# Patient Record
Sex: FEMALE | Race: WHITE | NOT HISPANIC OR LATINO | URBAN - METROPOLITAN AREA
[De-identification: names, ages, dates, MRNs, and addresses within clinical notes are randomized per-mention and may not be internally consistent; named-entity substitution may affect disease eponyms.]

---

## 2017-02-20 ENCOUNTER — OUTPATIENT (OUTPATIENT)
Dept: OUTPATIENT SERVICES | Facility: HOSPITAL | Age: 77
LOS: 1 days | Discharge: HOME | End: 2017-02-20

## 2017-06-27 DIAGNOSIS — Z12.31 ENCOUNTER FOR SCREENING MAMMOGRAM FOR MALIGNANT NEOPLASM OF BREAST: ICD-10-CM

## 2017-06-27 PROBLEM — Z00.00 ENCOUNTER FOR PREVENTIVE HEALTH EXAMINATION: Status: ACTIVE | Noted: 2017-06-27

## 2018-03-07 ENCOUNTER — OUTPATIENT (OUTPATIENT)
Dept: OUTPATIENT SERVICES | Facility: HOSPITAL | Age: 78
LOS: 1 days | Discharge: HOME | End: 2018-03-07

## 2018-03-07 DIAGNOSIS — R92.2 INCONCLUSIVE MAMMOGRAM: ICD-10-CM

## 2018-03-07 DIAGNOSIS — Z12.31 ENCOUNTER FOR SCREENING MAMMOGRAM FOR MALIGNANT NEOPLASM OF BREAST: ICD-10-CM

## 2018-06-11 ENCOUNTER — OUTPATIENT (OUTPATIENT)
Dept: OUTPATIENT SERVICES | Facility: HOSPITAL | Age: 78
LOS: 1 days | Discharge: HOME | End: 2018-06-11

## 2018-06-11 DIAGNOSIS — M17.0 BILATERAL PRIMARY OSTEOARTHRITIS OF KNEE: ICD-10-CM

## 2018-11-14 ENCOUNTER — EMERGENCY (EMERGENCY)
Facility: HOSPITAL | Age: 78
LOS: 0 days | Discharge: HOME | End: 2018-11-14
Attending: EMERGENCY MEDICINE | Admitting: EMERGENCY MEDICINE

## 2018-11-14 VITALS
SYSTOLIC BLOOD PRESSURE: 186 MMHG | OXYGEN SATURATION: 95 % | HEART RATE: 69 BPM | TEMPERATURE: 97 F | DIASTOLIC BLOOD PRESSURE: 91 MMHG | HEIGHT: 60 IN | RESPIRATION RATE: 20 BRPM | WEIGHT: 164.91 LBS

## 2018-11-14 VITALS
RESPIRATION RATE: 18 BRPM | HEART RATE: 70 BPM | OXYGEN SATURATION: 99 % | DIASTOLIC BLOOD PRESSURE: 80 MMHG | SYSTOLIC BLOOD PRESSURE: 170 MMHG

## 2018-11-14 DIAGNOSIS — Z23 ENCOUNTER FOR IMMUNIZATION: ICD-10-CM

## 2018-11-14 DIAGNOSIS — I10 ESSENTIAL (PRIMARY) HYPERTENSION: ICD-10-CM

## 2018-11-14 DIAGNOSIS — Z88.0 ALLERGY STATUS TO PENICILLIN: ICD-10-CM

## 2018-11-14 DIAGNOSIS — Z98.890 OTHER SPECIFIED POSTPROCEDURAL STATES: Chronic | ICD-10-CM
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
## 2018-11-14 DIAGNOSIS — S00.31XA ABRASION OF NOSE, INITIAL ENCOUNTER: ICD-10-CM

## 2018-11-14 DIAGNOSIS — F32.9 MAJOR DEPRESSIVE DISORDER, SINGLE EPISODE, UNSPECIFIED: ICD-10-CM
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
## 2018-11-14 DIAGNOSIS — E78.5 HYPERLIPIDEMIA, UNSPECIFIED: ICD-10-CM

## 2018-11-14 DIAGNOSIS — Z79.82 LONG TERM (CURRENT) USE OF ASPIRIN: ICD-10-CM
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
## 2018-11-14 DIAGNOSIS — S80.212A ABRASION, LEFT KNEE, INITIAL ENCOUNTER: ICD-10-CM

## 2018-11-14 DIAGNOSIS — M25.562 PAIN IN LEFT KNEE: ICD-10-CM

## 2018-11-14 RX ORDER — TETANUS TOXOID, REDUCED DIPHTHERIA TOXOID AND ACELLULAR PERTUSSIS VACCINE, ADSORBED 5; 2.5; 8; 8; 2.5 [IU]/.5ML; [IU]/.5ML; UG/.5ML; UG/.5ML; UG/.5ML
0.5 SUSPENSION INTRAMUSCULAR ONCE
Qty: 0 | Refills: 0 | Status: COMPLETED | OUTPATIENT
Start: 2018-11-14 | End: 2018-11-14

## 2018-11-14 RX ADMIN — TETANUS TOXOID, REDUCED DIPHTHERIA TOXOID AND ACELLULAR PERTUSSIS VACCINE, ADSORBED 0.5 MILLILITER(S): 5; 2.5; 8; 8; 2.5 SUSPENSION INTRAMUSCULAR at 15:07

## 2018-11-14 NOTE — ED PROVIDER NOTE - NS ED ROS FT
Review of Systems    Constitutional: (-) fever  Eyes/ENT: (-) blurry vision, (-) epistaxis, (+) injury to nose  Cardiovascular: (-) chest pain, (-) syncope  Respiratory: (-) cough, (-) shortness of breath  Gastrointestinal: (-) vomiting, (-) diarrhea  Musculoskeletal: (-) neck pain, (-) back pain, (+) left knee pain  Integumentary: (+) abrasions, (-) rash, (-) edema  Neurological: (-) headache, (-) altered mental status

## 2018-11-14 NOTE — ED PROVIDER NOTE - CARE PROVIDERS DIRECT ADDRESSES
,DirectAddress_Unknown,[email or web address]

## 2018-11-14 NOTE — ED PROVIDER NOTE - MEDICAL DECISION MAKING DETAILS
I personally evaluated the patient. I reviewed the Resident’s or Physician Assistant’s note (as assigned above), and agree with the findings and plan except as documented in my note. 77yo F comes in s/p trip and fall landing on her knees and falling forward hitting her face. No LOC, no vomiting, no neuro issues, pt is able to ambulate well. PE: Left knee with superficial abrasion, FROM of b/l knees, Nose non displaced, no septal hematoma. Plan CT, XR and re-eval

## 2018-11-14 NOTE — ED PROVIDER NOTE - PHYSICAL EXAMINATION
Gen: Alert, NAD, well appearing  Head: NC, AT, PERRL, EOMI, normal lids/conjunctiva  ENT: normal hearing, patent oropharynx. +swelling to nose, abrasion to nose. No septal hematoma  Neck: +supple, no tenderness/meningismus,  Pulm: Bilateral BS, normal resp effort, no wheeze/stridor/retractions  CV: RRR, no murmer  Abd: soft, NT/ND  Mskel: left knee: +tender, swelling, abrasion. No ecchymosis. ROM intact. Normal gait in ED. FROM to hands. NT to hands. Non tender to back on palpation. no edema/erythema/cyanosis.   Skin: no rash, warm/dry. +abrasion to nose, knees  Neuro: AAOx3, no sensory/motor deficits

## 2018-11-14 NOTE — ED ADULT TRIAGE NOTE - CHIEF COMPLAINT QUOTE
"I fell." Pt complains of pain to bilateral knees of which she has abrasion to left knee. Pt also complains of pain to the nose. Pt takes Aspirin 81 mg daily.

## 2018-11-14 NOTE — ED PROVIDER NOTE - ATTENDING CONTRIBUTION TO CARE
I personally evaluated the patient. I reviewed the Resident’s or Physician Assistant’s note (as assigned above), and agree with the findings and plan except as documented in my note. 77yo F comes in s/p trip and fall landing on her knees and falling forward hitting her face. No LOC, no vomiting, no neuro issues, pt is able to ambulate well. PE: Left knee with superficial abrasion, FROM

## 2018-11-14 NOTE — ED PROVIDER NOTE - CARE PROVIDER_API CALL
your pMD,   Phone: (   )    -  Fax: (   )    -    Arben Shepherd), Otolaryngology  [address] [place] [address]  Phone: [phone]  Fax: [phone]

## 2018-11-14 NOTE — ED PROVIDER NOTE - OBJECTIVE STATEMENT
79 yo F hx of HTN, high cholesterol here s/p fall. Patient tripped and fell at [employer] and landed on her hands, knees and nose. c/o pain to left knee and nose. No HA, LOC, n/v or change in mental status. No pain to neck or back. Patient ambulatory since fall. +abrasions

## 2018-11-14 NOTE — ED ADULT NURSE NOTE - PMH
Depressive disorder  Depression  Essential hypertension  Hypertension  Hyperlipidemia  Hyperlipidemia  Seizure

## 2019-05-31 PROBLEM — R56.9 UNSPECIFIED CONVULSIONS: Chronic | Status: ACTIVE | Noted: 2018-11-14

## 2019-06-12 ENCOUNTER — OUTPATIENT (OUTPATIENT)
Dept: OUTPATIENT SERVICES | Facility: HOSPITAL | Age: 79
LOS: 1 days | Discharge: HOME | End: 2019-06-12
Payer: MEDICARE

## 2019-06-12 DIAGNOSIS — Z80.3 FAMILY HISTORY OF MALIGNANT NEOPLASM OF BREAST: ICD-10-CM

## 2019-06-12 DIAGNOSIS — R92.2 INCONCLUSIVE MAMMOGRAM: ICD-10-CM

## 2019-06-12 DIAGNOSIS — Z12.31 ENCOUNTER FOR SCREENING MAMMOGRAM FOR MALIGNANT NEOPLASM OF BREAST: ICD-10-CM

## 2019-06-12 DIAGNOSIS — Z98.890 OTHER SPECIFIED POSTPROCEDURAL STATES: Chronic | ICD-10-CM

## 2019-06-12 PROCEDURE — 77063 BREAST TOMOSYNTHESIS BI: CPT | Mod: 26

## 2019-06-12 PROCEDURE — 76641 ULTRASOUND BREAST COMPLETE: CPT | Mod: 26,50

## 2019-06-12 PROCEDURE — 77067 SCR MAMMO BI INCL CAD: CPT | Mod: 26

## 2019-06-23 ENCOUNTER — EMERGENCY (EMERGENCY)
Facility: HOSPITAL | Age: 79
LOS: 0 days | Discharge: HOME | End: 2019-06-23
Attending: EMERGENCY MEDICINE | Admitting: EMERGENCY MEDICINE
Payer: MEDICARE

## 2019-06-23 VITALS
SYSTOLIC BLOOD PRESSURE: 159 MMHG | OXYGEN SATURATION: 99 % | RESPIRATION RATE: 18 BRPM | TEMPERATURE: 98 F | HEIGHT: 60 IN | WEIGHT: 164.02 LBS | DIASTOLIC BLOOD PRESSURE: 75 MMHG | HEART RATE: 66 BPM

## 2019-06-23 VITALS — SYSTOLIC BLOOD PRESSURE: 164 MMHG | HEART RATE: 59 BPM | DIASTOLIC BLOOD PRESSURE: 70 MMHG

## 2019-06-23 DIAGNOSIS — E89.0 POSTPROCEDURAL HYPOTHYROIDISM: ICD-10-CM

## 2019-06-23 DIAGNOSIS — Z91.041 RADIOGRAPHIC DYE ALLERGY STATUS: ICD-10-CM

## 2019-06-23 DIAGNOSIS — Z98.890 OTHER SPECIFIED POSTPROCEDURAL STATES: Chronic | ICD-10-CM

## 2019-06-23 DIAGNOSIS — R07.9 CHEST PAIN, UNSPECIFIED: ICD-10-CM

## 2019-06-23 DIAGNOSIS — Z79.899 OTHER LONG TERM (CURRENT) DRUG THERAPY: ICD-10-CM

## 2019-06-23 DIAGNOSIS — Z79.82 LONG TERM (CURRENT) USE OF ASPIRIN: ICD-10-CM

## 2019-06-23 DIAGNOSIS — Z88.1 ALLERGY STATUS TO OTHER ANTIBIOTIC AGENTS STATUS: ICD-10-CM

## 2019-06-23 DIAGNOSIS — R07.89 OTHER CHEST PAIN: ICD-10-CM

## 2019-06-23 DIAGNOSIS — Z88.0 ALLERGY STATUS TO PENICILLIN: ICD-10-CM

## 2019-06-23 DIAGNOSIS — I10 ESSENTIAL (PRIMARY) HYPERTENSION: ICD-10-CM

## 2019-06-23 DIAGNOSIS — E78.5 HYPERLIPIDEMIA, UNSPECIFIED: ICD-10-CM

## 2019-06-23 LAB
ALBUMIN SERPL ELPH-MCNC: 4.4 G/DL — SIGNIFICANT CHANGE UP (ref 3.5–5.2)
ALP SERPL-CCNC: 80 U/L — SIGNIFICANT CHANGE UP (ref 30–115)
ALT FLD-CCNC: 18 U/L — SIGNIFICANT CHANGE UP (ref 0–41)
ANION GAP SERPL CALC-SCNC: 7 MMOL/L — SIGNIFICANT CHANGE UP (ref 7–14)
AST SERPL-CCNC: 21 U/L — SIGNIFICANT CHANGE UP (ref 0–41)
BASOPHILS # BLD AUTO: 0.03 K/UL — SIGNIFICANT CHANGE UP (ref 0–0.2)
BASOPHILS NFR BLD AUTO: 0.5 % — SIGNIFICANT CHANGE UP (ref 0–1)
BILIRUB SERPL-MCNC: 0.3 MG/DL — SIGNIFICANT CHANGE UP (ref 0.2–1.2)
BUN SERPL-MCNC: 26 MG/DL — HIGH (ref 10–20)
CALCIUM SERPL-MCNC: 9.1 MG/DL — SIGNIFICANT CHANGE UP (ref 8.5–10.1)
CHLORIDE SERPL-SCNC: 102 MMOL/L — SIGNIFICANT CHANGE UP (ref 98–110)
CO2 SERPL-SCNC: 28 MMOL/L — SIGNIFICANT CHANGE UP (ref 17–32)
CREAT SERPL-MCNC: 0.8 MG/DL — SIGNIFICANT CHANGE UP (ref 0.7–1.5)
EOSINOPHIL # BLD AUTO: 0.21 K/UL — SIGNIFICANT CHANGE UP (ref 0–0.7)
EOSINOPHIL NFR BLD AUTO: 3.5 % — SIGNIFICANT CHANGE UP (ref 0–8)
GLUCOSE SERPL-MCNC: 109 MG/DL — HIGH (ref 70–99)
HCT VFR BLD CALC: 40.9 % — SIGNIFICANT CHANGE UP (ref 37–47)
HGB BLD-MCNC: 13.2 G/DL — SIGNIFICANT CHANGE UP (ref 12–16)
IMM GRANULOCYTES NFR BLD AUTO: 0.3 % — SIGNIFICANT CHANGE UP (ref 0.1–0.3)
LYMPHOCYTES # BLD AUTO: 2.01 K/UL — SIGNIFICANT CHANGE UP (ref 1.2–3.4)
LYMPHOCYTES # BLD AUTO: 33.4 % — SIGNIFICANT CHANGE UP (ref 20.5–51.1)
MCHC RBC-ENTMCNC: 28.9 PG — SIGNIFICANT CHANGE UP (ref 27–31)
MCHC RBC-ENTMCNC: 32.3 G/DL — SIGNIFICANT CHANGE UP (ref 32–37)
MCV RBC AUTO: 89.5 FL — SIGNIFICANT CHANGE UP (ref 81–99)
MONOCYTES # BLD AUTO: 0.63 K/UL — HIGH (ref 0.1–0.6)
MONOCYTES NFR BLD AUTO: 10.5 % — HIGH (ref 1.7–9.3)
NEUTROPHILS # BLD AUTO: 3.11 K/UL — SIGNIFICANT CHANGE UP (ref 1.4–6.5)
NEUTROPHILS NFR BLD AUTO: 51.8 % — SIGNIFICANT CHANGE UP (ref 42.2–75.2)
NRBC # BLD: 0 /100 WBCS — SIGNIFICANT CHANGE UP (ref 0–0)
PLATELET # BLD AUTO: 239 K/UL — SIGNIFICANT CHANGE UP (ref 130–400)
POTASSIUM SERPL-MCNC: 4.7 MMOL/L — SIGNIFICANT CHANGE UP (ref 3.5–5)
POTASSIUM SERPL-SCNC: 4.7 MMOL/L — SIGNIFICANT CHANGE UP (ref 3.5–5)
PROT SERPL-MCNC: 7.1 G/DL — SIGNIFICANT CHANGE UP (ref 6–8)
RBC # BLD: 4.57 M/UL — SIGNIFICANT CHANGE UP (ref 4.2–5.4)
RBC # FLD: 12.9 % — SIGNIFICANT CHANGE UP (ref 11.5–14.5)
SODIUM SERPL-SCNC: 137 MMOL/L — SIGNIFICANT CHANGE UP (ref 135–146)
TROPONIN T SERPL-MCNC: <0.01 NG/ML — SIGNIFICANT CHANGE UP
TROPONIN T SERPL-MCNC: <0.01 NG/ML — SIGNIFICANT CHANGE UP
WBC # BLD: 6.01 K/UL — SIGNIFICANT CHANGE UP (ref 4.8–10.8)
WBC # FLD AUTO: 6.01 K/UL — SIGNIFICANT CHANGE UP (ref 4.8–10.8)

## 2019-06-23 PROCEDURE — 99284 EMERGENCY DEPT VISIT MOD MDM: CPT

## 2019-06-23 PROCEDURE — 71046 X-RAY EXAM CHEST 2 VIEWS: CPT | Mod: 26

## 2019-06-23 RX ORDER — ASPIRIN/CALCIUM CARB/MAGNESIUM 324 MG
0 TABLET ORAL
Qty: 0 | Refills: 0 | DISCHARGE

## 2019-06-23 RX ORDER — LEVETIRACETAM 250 MG/1
0 TABLET, FILM COATED ORAL
Qty: 0 | Refills: 0 | DISCHARGE

## 2019-06-23 RX ORDER — LATANOPROST 0.05 MG/ML
0 SOLUTION/ DROPS OPHTHALMIC; TOPICAL
Qty: 0 | Refills: 0 | DISCHARGE

## 2019-06-23 RX ORDER — CHOLECALCIFEROL (VITAMIN D3) 125 MCG
0 CAPSULE ORAL
Qty: 0 | Refills: 0 | DISCHARGE

## 2019-06-23 RX ORDER — LISINOPRIL 2.5 MG/1
0 TABLET ORAL
Qty: 0 | Refills: 0 | DISCHARGE

## 2019-06-23 RX ORDER — PREGABALIN 225 MG/1
0 CAPSULE ORAL
Qty: 0 | Refills: 0 | DISCHARGE

## 2019-06-23 NOTE — ED PROVIDER NOTE - ATTENDING CONTRIBUTION TO CARE
78y female presents 2 hours after dull mid to left CP with no assoc symptoms, on exam vital signs appreciated, well appearing, head nc/at, perrla, EOMI, conj pink op clear neck supple cor rrr no mr/r/g no rash lungs cta abd snt no c/c/e pulses equal calves nontender neuro intact, labs and studies reviewed, patient remains well appearing and asymptomatic in ED, will d/c to f/u with cardio. Patient counseled regarding conditions which should prompt return.

## 2019-06-23 NOTE — ED PROVIDER NOTE - CARE PROVIDER_API CALL
Wesley Lion (MD)  Cardiovascular Disease; Internal Medicine; Interventional Cardiology  [address] [place] [address]  Phone: [phone]  Fax: [phone]  Follow Up Time:

## 2019-06-23 NOTE — ED ADULT NURSE NOTE - NSIMPLEMENTINTERV_GEN_ALL_ED
Implemented All Universal Safety Interventions:  [place] to call system. Call bell, personal items and telephone within reach. Instruct patient to call for assistance. Room bathroom lighting operational. Non-slip footwear when patient is off stretcher. Physically safe environment: no spills, clutter or unnecessary equipment. Stretcher in lowest position, wheels locked, appropriate side rails in place.

## 2019-06-23 NOTE — ED PROVIDER NOTE - OBJECTIVE STATEMENT
79 yo F pmhx sig for ht, hld pw dull non radiating L sided CP that began 3 hr PTA quick onset non radiating, no provoking or modifying factors. Not associated with SOB, palpitations, n/v, and diaphoresis. Fmhx of CAD mother @ 69 yo. No unilateral swelling, hemoptysis, estogen supplementation, malignancy, recent immobilization or surgery, or prior DVT/PE.    I have reviewed available current nursing and previous documentation of past medical, surgical, family, and/or social history.

## 2019-06-23 NOTE — ED PROVIDER NOTE - PHYSICAL EXAMINATION
Physical Exam    Vital Signs: I have reviewed the initial vital signs.  Constitutional: well-nourished, appears stated age, no acute distress  Eyes: PERRLA, EOM intact, RAPD absent, conjuctiva clear and symmetrical lids.  ENT: neck supple with no adenopathy, moist MM.  Cardiovascular: +S1/S2, no murmurs, regular rate, regular rhythm, well-perfused extremities  Respiratory: unlabored respiratory effort, clear to auscultation bilaterally, speaks in full sentences  Gastrointestinal: soft, non-tender abdomen, no pulsatile mass

## 2019-06-23 NOTE — ED PROVIDER NOTE - NS ED ROS FT
Review of Systems    Constitutional: (-) fever (-) weakness (-) diaphoresis   Cardiovascular: (-) palpitations  Respiratory: (-) SOB (-) cough   GI: (-) abdominal pain (-) N/V (-) diarrhea  Integumentary: (-) rash (-) redness

## 2019-07-13 ENCOUNTER — EMERGENCY (EMERGENCY)
Facility: HOSPITAL | Age: 79
LOS: 0 days | Discharge: HOME | End: 2019-07-14
Attending: EMERGENCY MEDICINE | Admitting: EMERGENCY MEDICINE
Payer: MEDICARE

## 2019-07-13 VITALS
TEMPERATURE: 98 F | SYSTOLIC BLOOD PRESSURE: 162 MMHG | DIASTOLIC BLOOD PRESSURE: 72 MMHG | HEART RATE: 64 BPM | OXYGEN SATURATION: 97 % | WEIGHT: 164.91 LBS | RESPIRATION RATE: 18 BRPM | HEIGHT: 61 IN

## 2019-07-13 DIAGNOSIS — Z88.1 ALLERGY STATUS TO OTHER ANTIBIOTIC AGENTS STATUS: ICD-10-CM

## 2019-07-13 DIAGNOSIS — Z98.890 OTHER SPECIFIED POSTPROCEDURAL STATES: Chronic | ICD-10-CM

## 2019-07-13 DIAGNOSIS — S80.219A ABRASION, UNSPECIFIED KNEE, INITIAL ENCOUNTER: ICD-10-CM
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
## 2019-07-13 DIAGNOSIS — Z91.041 RADIOGRAPHIC DYE ALLERGY STATUS: ICD-10-CM
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
## 2019-07-13 DIAGNOSIS — S09.90XA UNSPECIFIED INJURY OF HEAD, INITIAL ENCOUNTER: ICD-10-CM

## 2019-07-13 DIAGNOSIS — K13.0 DISEASES OF LIPS: ICD-10-CM

## 2019-07-13 DIAGNOSIS — S90.413A: ICD-10-CM

## 2019-07-13 DIAGNOSIS — Z88.0 ALLERGY STATUS TO PENICILLIN: ICD-10-CM
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
## 2019-07-13 DIAGNOSIS — S62.112A DISPLACED FRACTURE OF TRIQUETRUM [CUNEIFORM] BONE, LEFT WRIST, INITIAL ENCOUNTER FOR CLOSED FRACTURE: ICD-10-CM

## 2019-07-13 PROCEDURE — 70450 CT HEAD/BRAIN W/O DYE: CPT | Mod: 26

## 2019-07-13 PROCEDURE — 99284 EMERGENCY DEPT VISIT MOD MDM: CPT | Mod: 25

## 2019-07-13 PROCEDURE — 73130 X-RAY EXAM OF HAND: CPT | Mod: 26,LT

## 2019-07-13 PROCEDURE — 29125 APPL SHORT ARM SPLINT STATIC: CPT | Mod: GC

## 2019-07-13 NOTE — ED ADULT NURSE NOTE - NS_ED_NURSE_TEACHING_TOPIC_ED_A_ED
Detail Level: None Total Volume (Ccs): 1 Concentration (Mg/Ml): 40.0 Consent: The risks of atrophy were reviewed with the patient. Kenalog Preparation: kenalog Wound care

## 2019-07-14 NOTE — ED PROVIDER NOTE - OBJECTIVE STATEMENT
78 year old female on baby asa comes to emergency room status post fall and head injury. patient states that she was walking and tripped and fell forward on left side on hand and hit face. no loss of consciousness. was witness to by daughter.

## 2019-07-14 NOTE — ED PROVIDER NOTE - CARE PLAN
Principal Discharge DX:	Head injury  Secondary Diagnosis:	Hand fracture  Secondary Diagnosis:	Abrasion

## 2019-07-14 NOTE — ED PROVIDER NOTE - PHYSICAL EXAMINATION
Physical Exam    Vital Signs: I have reviewed the initial vital signs.  Constitutional: well-nourished, appears stated age, no acute distress  Eyes: Conjunctiva pink, Sclera clear, PERRLA, EOMI  Mouth: dentition intact. + left upper lip swelling  Ears: TMs clear  Cardiovascular: S1 and S2, regular rate, regular rhythm, well-perfused extremities, radial pulses equal and 2+  Respiratory: unlabored respiratory effort, clear to auscultation bilaterally no wheezing, rales and rhonchi  Gastrointestinal: soft, non-tender abdomen, no pulsatile mass, normal bowl sounds  Musculoskeletal: supple neck, no lower extremity edema, no midline tenderness + left hand swelling and ecchymosis no deformity. gait stable in emergency room   Integumentary: warm, dry, + abraion to knee and great toe  Neurologic: awake, alert, cranial nerves II-XII grossly intact, extremities’ motor and sensory functions grossly intact  Psychiatric: appropriate mood, appropriate affect

## 2019-07-14 NOTE — ED PROVIDER NOTE - ATTENDING CONTRIBUTION TO CARE
Pt with mechanical fall with left hand facture as above, NVI, also bumped face/head, mild swelling to left upper lip dentition intact imaging reviewed, will splint, ortho f/u. Patient counseled regarding conditions which should prompt return.

## 2019-07-14 NOTE — ED PROVIDER NOTE - CARE PROVIDER_API CALL
Thierry Abraham)  Orthopaedic Surgery  [address] [place] [address]  Phone: [phone]  Fax: [phone]  Follow Up Time: 1-3 Days

## 2019-07-14 NOTE — ED PROVIDER NOTE - NSFOLLOWUPINSTRUCTIONS_ED_ALL_ED_FT
Follow up with your primary care and orthopedic in 1-2 days    Head Injury    WHAT YOU NEED TO KNOW:    A head injury is most often caused by a blow to the head. This may occur from a fall, bicycle injury, sports injury, being struck in the head, or a motor vehicle accident.     DISCHARGE INSTRUCTIONS:    Call 911 or have someone else call for any of the following:     You cannot be woken.      You have a seizure.      You stop responding to others or you faint.      You have blurry or double vision.      Your speech becomes slurred or confused.      You have arm or leg weakness, loss of feeling, or new problems with coordination.      Your pupils are larger than usual or one pupil is a different size than the other.       You have blood or clear fluid coming out of your ears or nose.    Return to the emergency department if:     You have repeated or forceful vomiting.      You feel confused.      Your headache gets worse or becomes severe.      You or someone caring for you notices that you are harder to wake than usual.    Contact your healthcare provider if:     Your symptoms last longer than 6 weeks after the injury.      You have questions or concerns about your condition or care.    Medicines:     Acetaminophen decreases pain. Acetaminophen is available without a doctor's order. Ask how much to take and how often to take it. Follow directions. Acetaminophen can cause liver damage if not taken correctly.      Take your medicine as directed. Contact your healthcare provider if you think your medicine is not helping or if you have side effects. Tell him or her if you are allergic to any medicine. Keep a list of the medicines, vitamins, and herbs you take. Include the amounts, and when and why you take them. Bring the list or the pill bottles to follow-up visits. Carry your medicine list with you in case of an emergency.    Self-care:     Rest or do quiet activities for 24 to 48 hours. Limit your time watching TV, using the computer, or doing tasks that require a lot of thinking. Slowly return to your normal activities as directed. Do not play sports or do activities that may cause you to get hit in the head. Ask your healthcare provider when you can return to sports.       Apply ice on your head for 15 to 20 minutes every hour or as directed. Use an ice pack, or put crushed ice in a plastic bag. Cover it with a towel before you apply it to your skin. Ice helps prevent tissue damage and decreases swelling and pain.       Have someone stay with you for 24 hours or as directed. This person can monitor you for complications and call 911. When you are awake the person should ask you a few questions to see if you are thinking clearly. An example would be to ask your name or your address.     Prevent another head injury:     Wear a helmet that fits properly. Do this when you play sports, or ride a bike, scooter, or skateboard. Helmets help decrease your risk of a serious head injury. Talk to your healthcare provider about other ways you can protect yourself if you play sports.      Wear your seat belt every time you are in a car. This helps to decrease your risk for a head injury if you are in a car accident.     Follow up with your healthcare provider as directed: Write down your questions so you remember to ask them during your visits.        © Copyright [employer] 2019 All illustrations and images included in CareNotes are the copyrighted property of [employer] or [employer].        Hand Fracture    WHAT YOU NEED TO KNOW:    A hand fracture is a break in one of the bones in your hand. This includes the bones in the wrist and fingers, and those that connect the wrist to the fingers. A hand fracture may be caused by twisting or bending the hand in the wrong way. It may also be caused by a fall, a crush injury, or a sports injury.    DISCHARGE INSTRUCTIONS:    Call your doctor if:     Your arm feels warm, tender, and painful. It may look swollen and red.      You have severe pain that does not get better, even with pain medicine.      Your injured hand or forearm is cold, numb, or pale.      Your cast or splint gets wet, damaged, or comes off.      You have new sores around your brace, cast, or splint.      You notice a bad smell coming from under your cast.      You have questions or concerns about your condition or care.    Medicines: You may need any of the following:     NSAIDs help decrease swelling and pain or fever. This medicine is available with or without a doctor's order. NSAIDs can cause stomach bleeding or kidney problems in certain people. If you take blood thinner medicine, always ask your healthcare provider if NSAIDs are safe for you. Always read the medicine label and follow directions.      Acetaminophen decreases pain and fever. It is available without a doctor's order. Ask how much to take and how often to take it. Follow directions. Read the labels of all other medicines you are using to see if they also contain acetaminophen, or ask your doctor or pharmacist. Acetaminophen can cause liver damage if not taken correctly. Do not use more than 4 grams (4,000 milligrams) total of acetaminophen in one day.       Prescription pain medicine may be given. Ask your healthcare provider how to take this medicine safely. Some prescription pain medicines contain acetaminophen. Do not take other medicines that contain acetaminophen without talking to your healthcare provider. Too much acetaminophen may cause liver damage. Prescription pain medicine may cause constipation. Ask your healthcare provider how to prevent or treat constipation.       Take your medicine as directed. Contact your healthcare provider if you think your medicine is not helping or if you have side effects. Tell him or her if you are allergic to any medicine. Keep a list of the medicines, vitamins, and herbs you take. Include the amounts, and when and why you take them. Bring the list or the pill bottles to follow-up visits. Carry your medicine list with you in case of an emergency.    Follow up with your doctor or hand specialist as directed: You may need to return to have your cast, splint, or stitches removed. Write down your questions so you remember to ask them during your visits.    Manage your symptoms:     Wear your splint as directed. Do not remove the splint until you follow up with your healthcare provider or hand specialist.      Apply ice on your hand for 15 to 20 minutes every hour or as directed. Use an ice pack, or put crushed ice in a plastic bag. Cover it with a towel before you apply it to your skin. Ice helps prevent tissue damage and decreases swelling and pain.      Elevate your hand above the level of your heart as often as you can. This will help decrease swelling and pain. Prop your hand on pillows or blankets to keep it elevated comfortably.      Go to physical therapy as directed. A physical therapist teaches you exercises to help improve movement and strength and to decrease pain.    Bathing with a cast or splint: Do not let your cast or splint get wet. Before bathing, cover the cast or splint with a plastic bag. Tape the bag to your skin above the cast or splint to seal out the water. Keep your hand out of the water in case the bag leaks. Follow instructions about when it is okay to take a bath or shower.    Cast or splint care:     Check the skin around the cast or splint for redness or sores every day.      Do not push down or lean on any part of the cast or splint because it may break.      Do not use a sharp or pointed object to scratch your skin under the cast or splint.    Activity: You may not be able to drive for up to 2 weeks. Ask when it is safe for you to drive and return to other activities, such as sports.       © Copyright [employer] 2019 All illustrations and images included in CareNotes are the copyrighted property of [employer] or [employer].

## 2020-07-31 ENCOUNTER — OUTPATIENT (OUTPATIENT)
Dept: OUTPATIENT SERVICES | Facility: HOSPITAL | Age: 80
LOS: 1 days | Discharge: HOME | End: 2020-07-31
Payer: MEDICARE

## 2020-07-31 DIAGNOSIS — Z12.31 ENCOUNTER FOR SCREENING MAMMOGRAM FOR MALIGNANT NEOPLASM OF BREAST: ICD-10-CM

## 2020-07-31 DIAGNOSIS — R92.2 INCONCLUSIVE MAMMOGRAM: ICD-10-CM

## 2020-07-31 DIAGNOSIS — Z98.890 OTHER SPECIFIED POSTPROCEDURAL STATES: Chronic | ICD-10-CM

## 2020-07-31 PROCEDURE — 77067 SCR MAMMO BI INCL CAD: CPT | Mod: 26

## 2020-07-31 PROCEDURE — 77063 BREAST TOMOSYNTHESIS BI: CPT | Mod: 26

## 2020-07-31 PROCEDURE — 76641 ULTRASOUND BREAST COMPLETE: CPT | Mod: 26,50

## 2020-11-26 ENCOUNTER — TRANSCRIPTION ENCOUNTER (OUTPATIENT)
Age: 80
End: 2020-11-26

## 2022-04-09 ENCOUNTER — TRANSCRIPTION ENCOUNTER (OUTPATIENT)
Age: 82
End: 2022-04-09

## 2022-11-05 ENCOUNTER — NON-APPOINTMENT (OUTPATIENT)
Age: 82
End: 2022-11-05

## 2023-11-14 ENCOUNTER — NON-APPOINTMENT (OUTPATIENT)
Age: 83
End: 2023-11-14

## 2023-11-14 DIAGNOSIS — Z82.0 FAMILY HISTORY OF EPILEPSY AND OTHER DISEASES OF THE NERVOUS SYSTEM: ICD-10-CM

## 2023-11-14 DIAGNOSIS — R41.3 OTHER AMNESIA: ICD-10-CM

## 2023-11-14 DIAGNOSIS — G40.109 LOCALIZATION-RELATED (FOCAL) (PARTIAL) SYMPTOMATIC EPILEPSY AND EPILEPTIC SYNDROMES WITH SIMPLE PARTIAL SEIZURES, NOT INTRACTABLE, W/OUT STATUS EPILEPTICUS: ICD-10-CM

## 2023-11-14 DIAGNOSIS — E53.8 DEFICIENCY OF OTHER SPECIFIED B GROUP VITAMINS: ICD-10-CM

## 2023-11-14 DIAGNOSIS — R41.844 FRONTAL LOBE AND EXECUTIVE FUNCTION DEFICIT: ICD-10-CM

## 2023-11-14 DIAGNOSIS — E78.00 PURE HYPERCHOLESTEROLEMIA, UNSPECIFIED: ICD-10-CM

## 2023-11-14 DIAGNOSIS — H40.9 UNSPECIFIED GLAUCOMA: ICD-10-CM

## 2023-11-14 DIAGNOSIS — I10 ESSENTIAL (PRIMARY) HYPERTENSION: ICD-10-CM

## 2023-11-14 DIAGNOSIS — E66.3 OVERWEIGHT: ICD-10-CM

## 2023-11-14 DIAGNOSIS — Z92.89 PERSONAL HISTORY OF OTHER MEDICAL TREATMENT: ICD-10-CM
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
## 2023-11-14 DIAGNOSIS — F03.90 UNSPECIFIED DEMENTIA W/OUT BEHAVIORAL DISTURBANCE: ICD-10-CM

## 2023-11-14 RX ORDER — LATANOPROST/PF 0.005 %
0.01 DROPS OPHTHALMIC (EYE)
Refills: 0 | Status: ACTIVE | COMMUNITY

## 2023-11-14 RX ORDER — PRAVASTATIN SODIUM 40 MG/1
40 TABLET ORAL
Refills: 0 | Status: ACTIVE | COMMUNITY

## 2023-11-14 RX ORDER — LISINOPRIL AND HYDROCHLOROTHIAZIDE TABLETS 20; 12.5 MG/1; MG/1
20-12.5 TABLET ORAL
Refills: 0 | Status: ACTIVE | COMMUNITY

## 2023-11-14 RX ORDER — ASPIRIN 81 MG
81 TABLET,CHEWABLE ORAL
Refills: 0 | Status: ACTIVE | COMMUNITY

## 2023-11-14 RX ORDER — LEVETIRACETAM 500 MG/1
500 TABLET, FILM COATED ORAL
Refills: 0 | Status: ACTIVE | COMMUNITY

## 2023-11-14 RX ORDER — CYANOCOBALAMIN (VITAMIN B-12) 1000 MCG
1000 TABLET ORAL
Refills: 0 | Status: ACTIVE | COMMUNITY

## 2024-09-09 ENCOUNTER — EMERGENCY (EMERGENCY)
Facility: HOSPITAL | Age: 84
LOS: 0 days | Discharge: HOME | End: 2024-09-10
Attending: EMERGENCY MEDICINE
Payer: MEDICARE

## 2024-09-09 VITALS
OXYGEN SATURATION: 95 % | DIASTOLIC BLOOD PRESSURE: 77 MMHG | RESPIRATION RATE: 18 BRPM | HEART RATE: 88 BPM | WEIGHT: 149.91 LBS | SYSTOLIC BLOOD PRESSURE: 123 MMHG | TEMPERATURE: 98 F

## 2024-09-09 DIAGNOSIS — N39.0 URINARY TRACT INFECTION, SITE NOT SPECIFIED: ICD-10-CM

## 2024-09-09 DIAGNOSIS — Z88.1 ALLERGY STATUS TO OTHER ANTIBIOTIC AGENTS: ICD-10-CM

## 2024-09-09 DIAGNOSIS — Z91.041 RADIOGRAPHIC DYE ALLERGY STATUS: ICD-10-CM

## 2024-09-09 DIAGNOSIS — R41.0 DISORIENTATION, UNSPECIFIED: ICD-10-CM

## 2024-09-09 DIAGNOSIS — R53.1 WEAKNESS: ICD-10-CM

## 2024-09-09 DIAGNOSIS — Z98.890 OTHER SPECIFIED POSTPROCEDURAL STATES: Chronic | ICD-10-CM

## 2024-09-09 DIAGNOSIS — Z88.0 ALLERGY STATUS TO PENICILLIN: ICD-10-CM

## 2024-09-09 DIAGNOSIS — F03.90 UNSPECIFIED DEMENTIA, UNSPECIFIED SEVERITY, WITHOUT BEHAVIORAL DISTURBANCE, PSYCHOTIC DISTURBANCE, MOOD DISTURBANCE, AND ANXIETY: ICD-10-CM

## 2024-09-09 LAB
ALBUMIN SERPL ELPH-MCNC: 4.2 G/DL — SIGNIFICANT CHANGE UP (ref 3.5–5.2)
ALP SERPL-CCNC: 121 U/L — HIGH (ref 30–115)
ALT FLD-CCNC: 94 U/L — HIGH (ref 0–41)
ANION GAP SERPL CALC-SCNC: 11 MMOL/L — SIGNIFICANT CHANGE UP (ref 7–14)
APPEARANCE UR: ABNORMAL
AST SERPL-CCNC: 116 U/L — HIGH (ref 0–41)
BASOPHILS # BLD AUTO: 0.03 K/UL — SIGNIFICANT CHANGE UP (ref 0–0.2)
BASOPHILS NFR BLD AUTO: 0.4 % — SIGNIFICANT CHANGE UP (ref 0–1)
BILIRUB SERPL-MCNC: 0.4 MG/DL — SIGNIFICANT CHANGE UP (ref 0.2–1.2)
BILIRUB UR-MCNC: NEGATIVE — SIGNIFICANT CHANGE UP
BUN SERPL-MCNC: 35 MG/DL — HIGH (ref 10–20)
CALCIUM SERPL-MCNC: 9.5 MG/DL — SIGNIFICANT CHANGE UP (ref 8.4–10.5)
CHLORIDE SERPL-SCNC: 103 MMOL/L — SIGNIFICANT CHANGE UP (ref 98–110)
CO2 SERPL-SCNC: 25 MMOL/L — SIGNIFICANT CHANGE UP (ref 17–32)
COLOR SPEC: YELLOW — SIGNIFICANT CHANGE UP
CREAT SERPL-MCNC: 1.2 MG/DL — SIGNIFICANT CHANGE UP (ref 0.7–1.5)
DIFF PNL FLD: NEGATIVE — SIGNIFICANT CHANGE UP
EGFR: 45 ML/MIN/1.73M2 — LOW
EOSINOPHIL # BLD AUTO: 0.25 K/UL — SIGNIFICANT CHANGE UP (ref 0–0.7)
EOSINOPHIL NFR BLD AUTO: 3 % — SIGNIFICANT CHANGE UP (ref 0–8)
FLUAV AG NPH QL: SIGNIFICANT CHANGE UP
FLUBV AG NPH QL: SIGNIFICANT CHANGE UP
GLUCOSE SERPL-MCNC: 146 MG/DL — HIGH (ref 70–99)
GLUCOSE UR QL: NEGATIVE MG/DL — SIGNIFICANT CHANGE UP
HCT VFR BLD CALC: 41.6 % — SIGNIFICANT CHANGE UP (ref 37–47)
HGB BLD-MCNC: 13.7 G/DL — SIGNIFICANT CHANGE UP (ref 12–16)
IMM GRANULOCYTES NFR BLD AUTO: 0.2 % — SIGNIFICANT CHANGE UP (ref 0.1–0.3)
KETONES UR-MCNC: NEGATIVE MG/DL — SIGNIFICANT CHANGE UP
LEUKOCYTE ESTERASE UR-ACNC: ABNORMAL
LIDOCAIN IGE QN: 39 U/L — SIGNIFICANT CHANGE UP (ref 7–60)
LYMPHOCYTES # BLD AUTO: 2.79 K/UL — SIGNIFICANT CHANGE UP (ref 1.2–3.4)
LYMPHOCYTES # BLD AUTO: 33 % — SIGNIFICANT CHANGE UP (ref 20.5–51.1)
MAGNESIUM SERPL-MCNC: 2.3 MG/DL — SIGNIFICANT CHANGE UP (ref 1.8–2.4)
MCHC RBC-ENTMCNC: 29.1 PG — SIGNIFICANT CHANGE UP (ref 27–31)
MCHC RBC-ENTMCNC: 32.9 G/DL — SIGNIFICANT CHANGE UP (ref 32–37)
MCV RBC AUTO: 88.3 FL — SIGNIFICANT CHANGE UP (ref 81–99)
MONOCYTES # BLD AUTO: 0.96 K/UL — HIGH (ref 0.1–0.6)
MONOCYTES NFR BLD AUTO: 11.3 % — HIGH (ref 1.7–9.3)
NEUTROPHILS # BLD AUTO: 4.41 K/UL — SIGNIFICANT CHANGE UP (ref 1.4–6.5)
NEUTROPHILS NFR BLD AUTO: 52.1 % — SIGNIFICANT CHANGE UP (ref 42.2–75.2)
NITRITE UR-MCNC: NEGATIVE — SIGNIFICANT CHANGE UP
NRBC # BLD: 0 /100 WBCS — SIGNIFICANT CHANGE UP (ref 0–0)
PH UR: 5.5 — SIGNIFICANT CHANGE UP (ref 5–8)
PLATELET # BLD AUTO: 187 K/UL — SIGNIFICANT CHANGE UP (ref 130–400)
PMV BLD: 10.3 FL — SIGNIFICANT CHANGE UP (ref 7.4–10.4)
POTASSIUM SERPL-MCNC: 4.2 MMOL/L — SIGNIFICANT CHANGE UP (ref 3.5–5)
POTASSIUM SERPL-SCNC: 4.2 MMOL/L — SIGNIFICANT CHANGE UP (ref 3.5–5)
PROT SERPL-MCNC: 7.3 G/DL — SIGNIFICANT CHANGE UP (ref 6–8)
PROT UR-MCNC: 30 MG/DL
RBC # BLD: 4.71 M/UL — SIGNIFICANT CHANGE UP (ref 4.2–5.4)
RBC # FLD: 12.7 % — SIGNIFICANT CHANGE UP (ref 11.5–14.5)
RSV RNA NPH QL NAA+NON-PROBE: SIGNIFICANT CHANGE UP
SARS-COV-2 RNA SPEC QL NAA+PROBE: SIGNIFICANT CHANGE UP
SODIUM SERPL-SCNC: 139 MMOL/L — SIGNIFICANT CHANGE UP (ref 135–146)
SP GR SPEC: 1.02 — SIGNIFICANT CHANGE UP (ref 1–1.03)
TROPONIN T, HIGH SENSITIVITY RESULT: 12 NG/L — SIGNIFICANT CHANGE UP (ref 6–13)
UROBILINOGEN FLD QL: 0.2 MG/DL — SIGNIFICANT CHANGE UP (ref 0.2–1)
WBC # BLD: 8.46 K/UL — SIGNIFICANT CHANGE UP (ref 4.8–10.8)
WBC # FLD AUTO: 8.46 K/UL — SIGNIFICANT CHANGE UP (ref 4.8–10.8)

## 2024-09-09 PROCEDURE — 0241U: CPT

## 2024-09-09 PROCEDURE — 70450 CT HEAD/BRAIN W/O DYE: CPT | Mod: 26,MC

## 2024-09-09 PROCEDURE — 87086 URINE CULTURE/COLONY COUNT: CPT

## 2024-09-09 PROCEDURE — 70450 CT HEAD/BRAIN W/O DYE: CPT | Mod: MC

## 2024-09-09 PROCEDURE — 81001 URINALYSIS AUTO W/SCOPE: CPT

## 2024-09-09 PROCEDURE — 82962 GLUCOSE BLOOD TEST: CPT

## 2024-09-09 PROCEDURE — 74176 CT ABD & PELVIS W/O CONTRAST: CPT | Mod: 26,MC

## 2024-09-09 PROCEDURE — 83690 ASSAY OF LIPASE: CPT

## 2024-09-09 PROCEDURE — 71045 X-RAY EXAM CHEST 1 VIEW: CPT | Mod: 26

## 2024-09-09 PROCEDURE — 99285 EMERGENCY DEPT VISIT HI MDM: CPT | Mod: 25

## 2024-09-09 PROCEDURE — 80053 COMPREHEN METABOLIC PANEL: CPT

## 2024-09-09 PROCEDURE — 93005 ELECTROCARDIOGRAM TRACING: CPT

## 2024-09-09 PROCEDURE — 83735 ASSAY OF MAGNESIUM: CPT

## 2024-09-09 PROCEDURE — 71045 X-RAY EXAM CHEST 1 VIEW: CPT

## 2024-09-09 PROCEDURE — 99285 EMERGENCY DEPT VISIT HI MDM: CPT | Mod: FS

## 2024-09-09 PROCEDURE — 93010 ELECTROCARDIOGRAM REPORT: CPT

## 2024-09-09 PROCEDURE — 85025 COMPLETE CBC W/AUTO DIFF WBC: CPT

## 2024-09-09 PROCEDURE — 74176 CT ABD & PELVIS W/O CONTRAST: CPT | Mod: MC

## 2024-09-09 PROCEDURE — 84484 ASSAY OF TROPONIN QUANT: CPT

## 2024-09-09 PROCEDURE — 36415 COLL VENOUS BLD VENIPUNCTURE: CPT

## 2024-09-10 LAB
AMORPH SED URNS QL MICRO: PRESENT
BACTERIA # UR AUTO: ABNORMAL /HPF
EPI CELLS # UR: PRESENT
GRAN CASTS # UR COMP ASSIST: PRESENT
HYALINE CASTS # UR AUTO: SIGNIFICANT CHANGE UP
RBC CASTS # UR COMP ASSIST: 2 /HPF — SIGNIFICANT CHANGE UP (ref 0–4)
SQUAMOUS # UR AUTO: SIGNIFICANT CHANGE UP /HPF (ref 0–5)
TRANS CELLS #/AREA URNS HPF: PRESENT
WBC UR QL: >50 /HPF — HIGH (ref 0–5)

## 2024-09-10 RX ORDER — LEVOFLOXACIN 5 MG/ML
1 INJECTION, SOLUTION INTRAVENOUS
Qty: 4 | Refills: 0
Start: 2024-09-10 | End: 2024-09-13

## 2024-09-10 NOTE — ED ADULT NURSE NOTE - NSICDXPASTSURGICALHX_GEN_ALL_CORE_FT
Patient in  transitional care unit on [address]. She thinks they might be moving her into a assisted living.     Pt is requesting that her PCP calls her. She would like to give him some updates. Thanks    Carolyn Hollingsworth RN  [facility]   
PAST SURGICAL HISTORY:  H/O partial thyroidectomy

## 2024-09-10 NOTE — ED PROVIDER NOTE - NSFOLLOWUPINSTRUCTIONS_ED_ALL_ED_FT
Follow up with your primary care doctor in 1-2 days    Urinary Tract Infection, Adult  ImageA urinary tract infection (UTI) is an infection of any part of the urinary tract, which includes the kidneys, ureters, bladder, and urethra. These organs make, store, and get rid of urine in the body. UTI can be a bladder infection (cystitis) or kidney infection (pyelonephritis).    What are the causes?  This infection may be caused by fungi, viruses, or bacteria. Bacteria are the most common cause of UTIs. This condition can also be caused by repeated incomplete emptying of the bladder during urination.    What increases the risk?  This condition is more likely to develop if:    You ignore your need to urinate or hold urine for long periods of time.  You do not empty your bladder completely during urination.  You wipe back to front after urinating or having a bowel movement, if you are female.  You are uncircumcised, if you are male.  You are constipated.  You have a urinary catheter that stays in place (indwelling).  You have a weak defense (immune) system.  You have a medical condition that affects your bowels, kidneys, or bladder.  You have diabetes.  You take antibiotic medicines frequently or for long periods of time, and the antibiotics no longer work well against certain types of infections (antibiotic resistance).  You take medicines that irritate your urinary tract.  You are exposed to chemicals that irritate your urinary tract.  You are female.    What are the signs or symptoms?  Symptoms of this condition include:    Fever.  Frequent urination or passing small amounts of urine frequently.  Needing to urinate urgently.  Pain or burning with urination.  Urine that smells bad or unusual.  Cloudy urine.  Pain in the lower abdomen or back.  Trouble urinating.  Blood in the urine.  Vomiting or being less hungry than normal.  Diarrhea or abdominal pain.  Vaginal discharge, if you are female.    How is this diagnosed?  This condition is diagnosed with a medical history and physical exam. You will also need to provide a urine sample to test your urine. Other tests may be done, including:    Blood tests.  Sexually transmitted disease (STD) testing.    If you have had more than one UTI, a cystoscopy or imaging studies may be done to determine the cause of the infections.    How is this treated?  Treatment for this condition often includes a combination of two or more of the following:    Antibiotic medicine.  Other medicines to treat less common causes of UTI.  Over-the-counter medicines to treat pain.  Drinking enough water to stay hydrated.    Follow these instructions at home:  Take over-the-counter and prescription medicines only as told by your health care provider.  If you were prescribed an antibiotic, take it as told by your health care provider. Do not stop taking the antibiotic even if you start to feel better.  Avoid alcohol, caffeine, tea, and carbonated beverages. They can irritate your bladder.  Drink enough fluid to keep your urine clear or pale yellow.  Keep all follow-up visits as told by your health care provider. This is important.  ImageMake sure to:    Empty your bladder often and completely. Do not hold urine for long periods of time.  Empty your bladder before and after sex.  Wipe from front to back after a bowel movement if you are female. Use each tissue one time when you wipe.    Contact a health care provider if:  You have back pain.  You have a fever.  You feel nauseous or vomit.  Your symptoms do not get better after 3 days.  Your symptoms go away and then return.  Get help right away if:  You have severe back pain or lower abdominal pain.  You are vomiting and cannot keep down any medicines or water.  This information is not intended to replace advice given to you by your health care provider. Make sure you discuss any questions you have with your health care provider.

## 2024-09-10 NOTE — ED ADULT NURSE NOTE - NSFALLHARMRISKINTERV_ED_ALL_ED

## 2024-09-10 NOTE — ED PROVIDER NOTE - CLINICAL SUMMARY MEDICAL DECISION MAKING FREE TEXT BOX
83 year old female past medical history of dementia comes to emergency room  increasing weakness and confusion over the last 3 days. patient daughter states that today weakness is worse and noted blood in her urine. no falls, cough, sob, abd pain, n/v or rash. on exam, nontoxic, vs noted   Gen: Alert, NAD  Skin: Warm, dry, intact  Head: NCAT  ENT: Mucous membranes moist  Neck: Supple  CV: RRR, normal S1, S2, no m/r/g  Resp: Non labored respirations, Lungs CTA b/l, no wheezes, rales, rhonchi  Abdomen: Soft, nondistended, nontender  Extremities: Moving all extremities, no edema  Neuro: No focal neuro deficits  Psych: Cooperative   ED workup with possible UTI and PNA. RX levaquin. family would like to take her home. In my opinion, based on current evaluation and results, an acute medical or surgical emergency does not appear to be occurring at this time and I feel that the pt is stable for further outpatient work up and/or treatment. Return precautions discussed.

## 2024-09-10 NOTE — ED PROVIDER NOTE - OBJECTIVE STATEMENT
83 year old female past medical history of dementia comes to emergency room  increasing weakness and confusion over the last 3 days. patient daughter states that today weakness is worse and noted blood in her urine and brought to emergency room.

## 2024-09-10 NOTE — ED PROVIDER NOTE - PATIENT PORTAL LINK FT
You can access the FollowMyHealth Patient Portal offered by [facility] by registering at the following website: [email or web address]. By joining [employer]’s FollowMyHealth portal, you will also be able to view your health information using other applications (apps) compatible with our system.

## 2024-09-10 NOTE — ED PROVIDER NOTE - NSICDXPASTMEDICALHX_GEN_ALL_CORE_FT
PAST MEDICAL HISTORY:  Depressive disorder Depression    Essential hypertension Hypertension    Hyperlipidemia Hyperlipidemia    Seizure

## 2024-09-11 ENCOUNTER — INPATIENT (INPATIENT)
Facility: HOSPITAL | Age: 84
LOS: 4 days | Discharge: HOME | DRG: 689 | End: 2024-09-16
Attending: STUDENT IN AN ORGANIZED HEALTH CARE EDUCATION/TRAINING PROGRAM | Admitting: INTERNAL MEDICINE
Payer: MEDICARE

## 2024-09-11 VITALS
SYSTOLIC BLOOD PRESSURE: 109 MMHG | DIASTOLIC BLOOD PRESSURE: 58 MMHG | OXYGEN SATURATION: 95 % | WEIGHT: 160.06 LBS | HEIGHT: 60 IN | TEMPERATURE: 98 F | HEART RATE: 91 BPM

## 2024-09-11 DIAGNOSIS — Z98.890 OTHER SPECIFIED POSTPROCEDURAL STATES: Chronic | ICD-10-CM

## 2024-09-11 DIAGNOSIS — R41.82 ALTERED MENTAL STATUS, UNSPECIFIED: ICD-10-CM

## 2024-09-11 LAB
ALBUMIN SERPL ELPH-MCNC: 3.9 G/DL — SIGNIFICANT CHANGE UP (ref 3.5–5.2)
ALP SERPL-CCNC: 94 U/L — SIGNIFICANT CHANGE UP (ref 30–115)
ALT FLD-CCNC: 69 U/L — HIGH (ref 0–41)
ANION GAP SERPL CALC-SCNC: 15 MMOL/L — HIGH (ref 7–14)
AST SERPL-CCNC: 76 U/L — HIGH (ref 0–41)
BASOPHILS # BLD AUTO: 0.02 K/UL — SIGNIFICANT CHANGE UP (ref 0–0.2)
BASOPHILS # BLD AUTO: 0.02 K/UL — SIGNIFICANT CHANGE UP (ref 0–0.2)
BASOPHILS NFR BLD AUTO: 0.3 % — SIGNIFICANT CHANGE UP (ref 0–1)
BASOPHILS NFR BLD AUTO: 0.3 % — SIGNIFICANT CHANGE UP (ref 0–1)
BILIRUB SERPL-MCNC: 0.5 MG/DL — SIGNIFICANT CHANGE UP (ref 0.2–1.2)
BUN SERPL-MCNC: 26 MG/DL — HIGH (ref 10–20)
CALCIUM SERPL-MCNC: 8.9 MG/DL — SIGNIFICANT CHANGE UP (ref 8.4–10.5)
CHLORIDE SERPL-SCNC: 96 MMOL/L — LOW (ref 98–110)
CO2 SERPL-SCNC: 22 MMOL/L — SIGNIFICANT CHANGE UP (ref 17–32)
CREAT SERPL-MCNC: 1.3 MG/DL — SIGNIFICANT CHANGE UP (ref 0.7–1.5)
EGFR: 41 ML/MIN/1.73M2 — LOW
EOSINOPHIL # BLD AUTO: 0.16 K/UL — SIGNIFICANT CHANGE UP (ref 0–0.7)
EOSINOPHIL # BLD AUTO: 0.16 K/UL — SIGNIFICANT CHANGE UP (ref 0–0.7)
EOSINOPHIL NFR BLD AUTO: 2.1 % — SIGNIFICANT CHANGE UP (ref 0–8)
EOSINOPHIL NFR BLD AUTO: 2.3 % — SIGNIFICANT CHANGE UP (ref 0–8)
GLUCOSE SERPL-MCNC: 119 MG/DL — HIGH (ref 70–99)
HCT VFR BLD CALC: 35.5 % — LOW (ref 37–47)
HCT VFR BLD CALC: 37.3 % — SIGNIFICANT CHANGE UP (ref 37–47)
HGB BLD-MCNC: 12 G/DL — SIGNIFICANT CHANGE UP (ref 12–16)
HGB BLD-MCNC: 12.4 G/DL — SIGNIFICANT CHANGE UP (ref 12–16)
IMM GRANULOCYTES NFR BLD AUTO: 0.1 % — SIGNIFICANT CHANGE UP (ref 0.1–0.3)
IMM GRANULOCYTES NFR BLD AUTO: 0.4 % — HIGH (ref 0.1–0.3)
LACTATE SERPL-SCNC: 1.4 MMOL/L — SIGNIFICANT CHANGE UP (ref 0.7–2)
LYMPHOCYTES # BLD AUTO: 1.71 K/UL — SIGNIFICANT CHANGE UP (ref 1.2–3.4)
LYMPHOCYTES # BLD AUTO: 1.81 K/UL — SIGNIFICANT CHANGE UP (ref 1.2–3.4)
LYMPHOCYTES # BLD AUTO: 23.5 % — SIGNIFICANT CHANGE UP (ref 20.5–51.1)
LYMPHOCYTES # BLD AUTO: 24.6 % — SIGNIFICANT CHANGE UP (ref 20.5–51.1)
MAGNESIUM SERPL-MCNC: 2 MG/DL — SIGNIFICANT CHANGE UP (ref 1.8–2.4)
MCHC RBC-ENTMCNC: 28.9 PG — SIGNIFICANT CHANGE UP (ref 27–31)
MCHC RBC-ENTMCNC: 29.4 PG — SIGNIFICANT CHANGE UP (ref 27–31)
MCHC RBC-ENTMCNC: 33.2 G/DL — SIGNIFICANT CHANGE UP (ref 32–37)
MCHC RBC-ENTMCNC: 33.8 G/DL — SIGNIFICANT CHANGE UP (ref 32–37)
MCV RBC AUTO: 86.9 FL — SIGNIFICANT CHANGE UP (ref 81–99)
MCV RBC AUTO: 87 FL — SIGNIFICANT CHANGE UP (ref 81–99)
MONOCYTES # BLD AUTO: 0.83 K/UL — HIGH (ref 0.1–0.6)
MONOCYTES # BLD AUTO: 0.9 K/UL — HIGH (ref 0.1–0.6)
MONOCYTES NFR BLD AUTO: 10.8 % — HIGH (ref 1.7–9.3)
MONOCYTES NFR BLD AUTO: 12.9 % — HIGH (ref 1.7–9.3)
NEUTROPHILS # BLD AUTO: 4.16 K/UL — SIGNIFICANT CHANGE UP (ref 1.4–6.5)
NEUTROPHILS # BLD AUTO: 4.86 K/UL — SIGNIFICANT CHANGE UP (ref 1.4–6.5)
NEUTROPHILS NFR BLD AUTO: 59.8 % — SIGNIFICANT CHANGE UP (ref 42.2–75.2)
NEUTROPHILS NFR BLD AUTO: 62.9 % — SIGNIFICANT CHANGE UP (ref 42.2–75.2)
NRBC # BLD: 0 /100 WBCS — SIGNIFICANT CHANGE UP (ref 0–0)
NRBC # BLD: 0 /100 WBCS — SIGNIFICANT CHANGE UP (ref 0–0)
PLATELET # BLD AUTO: 155 K/UL — SIGNIFICANT CHANGE UP (ref 130–400)
PLATELET # BLD AUTO: 156 K/UL — SIGNIFICANT CHANGE UP (ref 130–400)
PMV BLD: 10 FL — SIGNIFICANT CHANGE UP (ref 7.4–10.4)
PMV BLD: 10.3 FL — SIGNIFICANT CHANGE UP (ref 7.4–10.4)
POTASSIUM SERPL-MCNC: 4 MMOL/L — SIGNIFICANT CHANGE UP (ref 3.5–5)
POTASSIUM SERPL-SCNC: 4 MMOL/L — SIGNIFICANT CHANGE UP (ref 3.5–5)
PROT SERPL-MCNC: 6.7 G/DL — SIGNIFICANT CHANGE UP (ref 6–8)
RBC # BLD: 4.08 M/UL — LOW (ref 4.2–5.4)
RBC # BLD: 4.29 M/UL — SIGNIFICANT CHANGE UP (ref 4.2–5.4)
RBC # FLD: 12.7 % — SIGNIFICANT CHANGE UP (ref 11.5–14.5)
RBC # FLD: 12.7 % — SIGNIFICANT CHANGE UP (ref 11.5–14.5)
SODIUM SERPL-SCNC: 133 MMOL/L — LOW (ref 135–146)
WBC # BLD: 6.96 K/UL — SIGNIFICANT CHANGE UP (ref 4.8–10.8)
WBC # BLD: 7.71 K/UL — SIGNIFICANT CHANGE UP (ref 4.8–10.8)
WBC # FLD AUTO: 6.96 K/UL — SIGNIFICANT CHANGE UP (ref 4.8–10.8)
WBC # FLD AUTO: 7.71 K/UL — SIGNIFICANT CHANGE UP (ref 4.8–10.8)

## 2024-09-11 PROCEDURE — 36415 COLL VENOUS BLD VENIPUNCTURE: CPT

## 2024-09-11 PROCEDURE — 85027 COMPLETE CBC AUTOMATED: CPT

## 2024-09-11 PROCEDURE — 99222 1ST HOSP IP/OBS MODERATE 55: CPT

## 2024-09-11 PROCEDURE — 92610 EVALUATE SWALLOWING FUNCTION: CPT | Mod: GN

## 2024-09-11 PROCEDURE — 97110 THERAPEUTIC EXERCISES: CPT | Mod: GP

## 2024-09-11 PROCEDURE — 97116 GAIT TRAINING THERAPY: CPT | Mod: GP

## 2024-09-11 PROCEDURE — 82746 ASSAY OF FOLIC ACID SERUM: CPT

## 2024-09-11 PROCEDURE — 80053 COMPREHEN METABOLIC PANEL: CPT

## 2024-09-11 PROCEDURE — 87086 URINE CULTURE/COLONY COUNT: CPT

## 2024-09-11 PROCEDURE — 82607 VITAMIN B-12: CPT

## 2024-09-11 PROCEDURE — 93010 ELECTROCARDIOGRAM REPORT: CPT

## 2024-09-11 PROCEDURE — 97162 PT EVAL MOD COMPLEX 30 MIN: CPT | Mod: GP

## 2024-09-11 PROCEDURE — 99233 SBSQ HOSP IP/OBS HIGH 50: CPT

## 2024-09-11 PROCEDURE — 84443 ASSAY THYROID STIM HORMONE: CPT

## 2024-09-11 PROCEDURE — 99285 EMERGENCY DEPT VISIT HI MDM: CPT | Mod: FS

## 2024-09-11 PROCEDURE — 85025 COMPLETE CBC W/AUTO DIFF WBC: CPT

## 2024-09-11 RX ORDER — AMLODIPINE BESYLATE 10 MG/1
5 TABLET ORAL DAILY
Refills: 0 | Status: DISCONTINUED | OUTPATIENT
Start: 2024-09-11 | End: 2024-09-16

## 2024-09-11 RX ORDER — MAGNESIUM, ALUMINUM HYDROXIDE 200-225/5
30 SUSPENSION, ORAL (FINAL DOSE FORM) ORAL EVERY 4 HOURS
Refills: 0 | Status: DISCONTINUED | OUTPATIENT
Start: 2024-09-11 | End: 2024-09-16

## 2024-09-11 RX ORDER — ONDANSETRON 2 MG/ML
4 INJECTION, SOLUTION INTRAMUSCULAR; INTRAVENOUS EVERY 8 HOURS
Refills: 0 | Status: DISCONTINUED | OUTPATIENT
Start: 2024-09-11 | End: 2024-09-16

## 2024-09-11 RX ORDER — PAROXETINE 10 MG/1
0 TABLET, FILM COATED ORAL
Qty: 0 | Refills: 0 | DISCHARGE

## 2024-09-11 RX ORDER — LEVOTHYROXINE SODIUM 100 MCG
88 TABLET ORAL DAILY
Refills: 0 | Status: DISCONTINUED | OUTPATIENT
Start: 2024-09-11 | End: 2024-09-16

## 2024-09-11 RX ORDER — LAMOTRIGINE 100 MG/1
0 TABLET, EXTENDED RELEASE ORAL
Qty: 0 | Refills: 0 | DISCHARGE

## 2024-09-11 RX ORDER — ACETAMINOPHEN 325 MG/1
650 TABLET ORAL EVERY 6 HOURS
Refills: 0 | Status: DISCONTINUED | OUTPATIENT
Start: 2024-09-11 | End: 2024-09-16

## 2024-09-11 RX ORDER — LEVETIRACETAM 1000 MG/1
750 TABLET ORAL
Refills: 0 | Status: DISCONTINUED | OUTPATIENT
Start: 2024-09-11 | End: 2024-09-16

## 2024-09-11 RX ORDER — ROSUVASTATIN CALCIUM 10 MG/1
20 TABLET ORAL AT BEDTIME
Refills: 0 | Status: DISCONTINUED | OUTPATIENT
Start: 2024-09-11 | End: 2024-09-16

## 2024-09-11 RX ORDER — ASPIRIN 81 MG
81 TABLET, DELAYED RELEASE (ENTERIC COATED) ORAL DAILY
Refills: 0 | Status: DISCONTINUED | OUTPATIENT
Start: 2024-09-11 | End: 2024-09-16

## 2024-09-11 RX ORDER — PAROXETINE 10 MG/1
30 TABLET, FILM COATED ORAL DAILY
Refills: 0 | Status: DISCONTINUED | OUTPATIENT
Start: 2024-09-11 | End: 2024-09-16

## 2024-09-11 RX ORDER — SODIUM CHLORIDE 9 MG/ML
1000 INJECTION INTRAMUSCULAR; INTRAVENOUS; SUBCUTANEOUS
Refills: 0 | Status: DISCONTINUED | OUTPATIENT
Start: 2024-09-11 | End: 2024-09-16

## 2024-09-11 RX ORDER — LATANOPROST 50 UG/ML
1 SOLUTION OPHTHALMIC AT BEDTIME
Refills: 0 | Status: DISCONTINUED | OUTPATIENT
Start: 2024-09-11 | End: 2024-09-16

## 2024-09-11 RX ORDER — PAROXETINE 10 MG/1
20 TABLET, FILM COATED ORAL DAILY
Refills: 0 | Status: DISCONTINUED | OUTPATIENT
Start: 2024-09-11 | End: 2024-09-11

## 2024-09-11 RX ORDER — LAMOTRIGINE 100 MG/1
100 TABLET, EXTENDED RELEASE ORAL
Refills: 0 | Status: DISCONTINUED | OUTPATIENT
Start: 2024-09-11 | End: 2024-09-16

## 2024-09-11 RX ORDER — SODIUM CHLORIDE 9 MG/ML
1000 INJECTION INTRAMUSCULAR; INTRAVENOUS; SUBCUTANEOUS ONCE
Refills: 0 | Status: COMPLETED | OUTPATIENT
Start: 2024-09-11 | End: 2024-09-11

## 2024-09-11 RX ORDER — HEPARIN SODIUM,BOVINE 1000/ML
5000 VIAL (ML) INJECTION EVERY 12 HOURS
Refills: 0 | Status: DISCONTINUED | OUTPATIENT
Start: 2024-09-11 | End: 2024-09-16

## 2024-09-11 RX ORDER — ROSUVASTATIN CALCIUM 10 MG/1
0 TABLET ORAL
Qty: 0 | Refills: 0 | DISCHARGE

## 2024-09-11 RX ADMIN — Medication 5000 UNIT(S): at 17:52

## 2024-09-11 RX ADMIN — Medication 100 MILLIGRAM(S): at 17:51

## 2024-09-11 RX ADMIN — LATANOPROST 1 DROP(S): 50 SOLUTION OPHTHALMIC at 21:44

## 2024-09-11 RX ADMIN — PAROXETINE 30 MILLIGRAM(S): 10 TABLET, FILM COATED ORAL at 11:57

## 2024-09-11 RX ADMIN — Medication 50 MILLIGRAM(S): at 21:44

## 2024-09-11 RX ADMIN — ROSUVASTATIN CALCIUM 20 MILLIGRAM(S): 10 TABLET ORAL at 21:44

## 2024-09-11 RX ADMIN — LEVETIRACETAM 750 MILLIGRAM(S): 1000 TABLET ORAL at 17:52

## 2024-09-11 RX ADMIN — LAMOTRIGINE 100 MILLIGRAM(S): 100 TABLET, EXTENDED RELEASE ORAL at 17:51

## 2024-09-11 RX ADMIN — SODIUM CHLORIDE 2000 MILLILITER(S): 9 INJECTION INTRAMUSCULAR; INTRAVENOUS; SUBCUTANEOUS at 03:07

## 2024-09-11 RX ADMIN — Medication 81 MILLIGRAM(S): at 11:57

## 2024-09-11 NOTE — PATIENT PROFILE ADULT - FALL HARM RISK - HARM RISK INTERVENTIONS
Assistance with ambulation/Assistance OOB with selected safe patient handling equipment/Communicate Risk of Fall with Harm to all staff/Discuss with provider need for PT consult/Monitor gait and stability/Reinforce activity limits and safety measures with patient and family/Tailored Fall Risk Interventions/Toileting schedule using arm’s reach rule for commode and bathroom/Visual Cue: Yellow wristband and red socks/Bed in lowest position, wheels locked, appropriate side rails in place/Call bell, personal items and telephone in reach/Instruct patient to call for assistance before getting out of bed or chair/Non-slip footwear when patient is out of bed/[place] to call system/Physically safe environment - no spills, clutter or unnecessary equipment/Purposeful Proactive Rounding/Room/bathroom lighting operational, light cord in reach

## 2024-09-11 NOTE — PROGRESS NOTE ADULT - SUBJECTIVE AND OBJECTIVE BOX
LYLE VANCE  [facility] 4I 006 A ([facility]-S 4I)            Patient was evaluated and examined  by bedside,                 REVIEW OF SYSTEMS:  please see pertinent positives mentioned above, all other 12 ROS negative      T(C): , Max: 36.6 (09-11-24 @ 02:22)  HR: 90 (09-11-24 @ 05:30)  BP: 134/68 (09-11-24 @ 05:30)  RR: 18 (09-11-24 @ 05:30)  SpO2: 95% (09-11-24 @ 05:30)  CAPILLARY BLOOD GLUCOSE          PHYSICAL EXAM:  General: tearful, AOx1 (place)  HEENT: AT, NC, Supple  Lungs: CTA B/L  CVS: RRR  Abdomen: soft, bowel sounds present, non-tender, non-distended  Extremities: no edema  Neuro: no acute focal neurological deficits      LAB  CBC  Date: 09-11-24 @ 03:11  Mean cell Iwegeoamdf84.9  Mean cell Hemoglobin Conc33.2  Mean cell Volum 86.9  Platelet count-Automate 155  RBC Count 4.29  Red Cell Distrib Width12.7  WBC Count7.71  % Albumin, Urine--  Hematocrit 37.3  Hemoglobin 12.4  CBC  Date: 09-09-24 @ 19:55  Mean cell Mmnjozlxhi33.1  Mean cell Hemoglobin Conc32.9  Mean cell Volum 88.3  Platelet count-Automate 187  RBC Count 4.71  Red Cell Distrib Width12.7  WBC Count8.46  % Albumin, Urine--  Hematocrit 41.6  Hemoglobin 13.7    [facility]  09-11-24 @ 03:11  Blood Gas Arterial-Calcium,Ionized--  Blood Urea Nitrogen, Serum 26 mg/dL<H> [10 - 20]  Carbon Dioxide, Serum22 mmol/L [17 - 32]  Chloride, Serum96 mmol/L<L> [98 - 110]  Creatinie, Serum1.3 mg/dL [0.7 - 1.5]  Glucose, Lxkiy595 mg/dL<H> [70 - 99]  Potassium, Serum4.0 mmol/L [3.5 - 5.0]  Sodium, Serum 133 mmol/L<L> [135 - 146]  [facility]  09-09-24 @ 19:55  Blood Gas Arterial-Calcium,Ionized--  Blood Urea Nitrogen, Serum 35 mg/dL<H> [10 - 20]  Carbon Dioxide, Serum25 mmol/L [17 - 32]  Chloride, Ebkny954 mmol/L [98 - 110]  Creatinie, Serum1.2 mg/dL [0.7 - 1.5]  Glucose, Shpqw936 mg/dL<H> [70 - 99]  Potassium, Serum4.2 mmol/L [3.5 - 5.0]  Sodium, Serum 139 mmol/L [135 - 146]              Microbiology:      RADIOLOGY & ADDITIONAL TESTS:        Medications:  acetaminophen     Tablet .. 650 milliGRAM(s) Oral every 6 hours PRN  aluminum hydroxide/magnesium hydroxide/simethicone Suspension 30 milliLiter(s) Oral every 4 hours PRN  amLODIPine   Tablet 5 milliGRAM(s) Oral daily  aspirin enteric coated 81 milliGRAM(s) Oral daily  lamoTRIgine 100 milliGRAM(s) Oral two times a day  latanoprost 0.005% Ophthalmic Solution 1 Drop(s) Both EYES at bedtime  levETIRAcetam 750 milliGRAM(s) Oral two times a day  levoFLOXacin IVPB 750 milliGRAM(s) IV Intermittent every 48 hours  levothyroxine 88 MICROGram(s) Oral daily  ondansetron Injectable 4 milliGRAM(s) IV Push every 8 hours PRN  PARoxetine 30 milliGRAM(s) Oral daily  QUEtiapine 50 milliGRAM(s) Oral at bedtime  rosuvastatin 20 milliGRAM(s) Oral at bedtime  sodium chloride 0.9%. 1000 milliLiter(s) IV Continuous <Continuous>        Assessment and Plan:  83 year old female past medical history of dementia, seizure, HTN, HLD, hypothyroidism comes to emergency room for AMS. patient in emergency room 2 days ago diagnosed with UTI and sent home with levaquin. confused over past 4 days with hematuria, had CT head for confusion and CTAP  that showed no acute pathology .  brought in again by family for continued  symptoms      # Acute metabolic encephalopathy  Patient alert and oriented to self and place at baseline, able to recognize her daughter and able to perform basic ADls with minimal support. Since Monday, she has been feeling more weak, unable to perform ADls and more disoriented, particularly to place. Typically not oriented in time. No clear neurologic deficits on exam otherwise. Suspect the etiology is metabolic, likely related to probable UTI. Blood cultures have also been drawn to investigate for bacteremia. Her imaging exams have not revealed any acute intracranial or intrabdominal pathology and she does not have any localizing signs or symptoms of a respiratory infection. Of note, slightly elevated LFTs which are now downtrending might indicate a recovering viral syndrome or may be related to antibiotic use.  - c/w Levaquin, switched to IV, favor pivoting to a different regimen given non clinical response but will await for ID recommendations and some cultures result first  - ID consult, appreciate recs  - f/u Bcx and UCx  - Will also send TSH, B12 and folate levels to investigate other causes of encephalopathy  - If no improvement with the appropriate antibiotic regimen, will consult neurology to further evaluate    # Seizures  - c/w levetiracetam and lamotrigine (home meds)  - seizure precautions     #Hepatocellular injury  -Mild and downtrending, suspect related to antibiotics vs. resolving viral syndrome  -CTM LFTs daily    # HTN  # HLD  - c/w amlodipine   - c/w statin  - monitor BP  - DASH diet    # Hypothyroidism  - c/w synthroid     Diet:  VTE PPx: heparin TID (Cre elevated for age suspect GFR lower than calculated)  PT/OT/Dispo:PT consulted, will follow recs  Lines: PIV  Hurtado:No hurtado in place  Surrogate: Daughter at the bedside    #Progress note handoff: Pending consults: ID, PT Tests: TSH, Test Results: Daily LFTs, follow UCx and BCx FamilyDiscussion:Daughter updated at the bedside Disposition:Pending improvement in mental status, PT to evaluate as well         LYLE VANCE  [facility] 4I 006 A ([facility]-S 4I)            Patient was evaluated and examined  by bedside,                 REVIEW OF SYSTEMS:  please see pertinent positives mentioned above, all other 12 ROS negative      T(C): , Max: 36.6 (09-11-24 @ 02:22)  HR: 90 (09-11-24 @ 05:30)  BP: 134/68 (09-11-24 @ 05:30)  RR: 18 (09-11-24 @ 05:30)  SpO2: 95% (09-11-24 @ 05:30)  CAPILLARY BLOOD GLUCOSE          PHYSICAL EXAM:  General: tearful, AOx1 (place)  HEENT: AT, NC, Supple  Lungs: CTA B/L  CVS: RRR  Abdomen: soft, bowel sounds present, non-tender, non-distended  Extremities: no edema  Neuro: no acute focal neurological deficits      LAB  CBC  Date: 09-11-24 @ 03:11  Mean cell Wkphthmeps73.9  Mean cell Hemoglobin Conc33.2  Mean cell Volum 86.9  Platelet count-Automate 155  RBC Count 4.29  Red Cell Distrib Width12.7  WBC Count7.71  % Albumin, Urine--  Hematocrit 37.3  Hemoglobin 12.4  CBC  Date: 09-09-24 @ 19:55  Mean cell Munlqcjvcl64.1  Mean cell Hemoglobin Conc32.9  Mean cell Volum 88.3  Platelet count-Automate 187  RBC Count 4.71  Red Cell Distrib Width12.7  WBC Count8.46  % Albumin, Urine--  Hematocrit 41.6  Hemoglobin 13.7    [facility]  09-11-24 @ 03:11  Blood Gas Arterial-Calcium,Ionized--  Blood Urea Nitrogen, Serum 26 mg/dL<H> [10 - 20]  Carbon Dioxide, Serum22 mmol/L [17 - 32]  Chloride, Serum96 mmol/L<L> [98 - 110]  Creatinie, Serum1.3 mg/dL [0.7 - 1.5]  Glucose, Bxcro562 mg/dL<H> [70 - 99]  Potassium, Serum4.0 mmol/L [3.5 - 5.0]  Sodium, Serum 133 mmol/L<L> [135 - 146]  [facility]  09-09-24 @ 19:55  Blood Gas Arterial-Calcium,Ionized--  Blood Urea Nitrogen, Serum 35 mg/dL<H> [10 - 20]  Carbon Dioxide, Serum25 mmol/L [17 - 32]  Chloride, Ptwgl626 mmol/L [98 - 110]  Creatinie, Serum1.2 mg/dL [0.7 - 1.5]  Glucose, Dolcw866 mg/dL<H> [70 - 99]  Potassium, Serum4.2 mmol/L [3.5 - 5.0]  Sodium, Serum 139 mmol/L [135 - 146]              Microbiology:      RADIOLOGY & ADDITIONAL TESTS:        Medications:  acetaminophen     Tablet .. 650 milliGRAM(s) Oral every 6 hours PRN  aluminum hydroxide/magnesium hydroxide/simethicone Suspension 30 milliLiter(s) Oral every 4 hours PRN  amLODIPine   Tablet 5 milliGRAM(s) Oral daily  aspirin enteric coated 81 milliGRAM(s) Oral daily  lamoTRIgine 100 milliGRAM(s) Oral two times a day  latanoprost 0.005% Ophthalmic Solution 1 Drop(s) Both EYES at bedtime  levETIRAcetam 750 milliGRAM(s) Oral two times a day  levoFLOXacin IVPB 750 milliGRAM(s) IV Intermittent every 48 hours  levothyroxine 88 MICROGram(s) Oral daily  ondansetron Injectable 4 milliGRAM(s) IV Push every 8 hours PRN  PARoxetine 30 milliGRAM(s) Oral daily  QUEtiapine 50 milliGRAM(s) Oral at bedtime  rosuvastatin 20 milliGRAM(s) Oral at bedtime  sodium chloride 0.9%. 1000 milliLiter(s) IV Continuous <Continuous>        Assessment and Plan:  83 year old female past medical history of dementia, seizure, HTN, HLD, hypothyroidism comes to emergency room for AMS. patient in emergency room 2 days ago diagnosed with UTI and sent home with levaquin. confused over past 4 days with hematuria, had CT head for confusion and CTAP  that showed no acute pathology .  brought in again by family for continued  symptoms      # Acute metabolic encephalopathy  Patient alert and oriented to self and place at baseline, able to recognize her daughter and able to perform basic ADls with minimal support. Since Monday, she has been feeling more weak, unable to perform ADls and more disoriented, particularly to place. Typically not oriented in time. No clear neurologic deficits on exam otherwise. Suspect the etiology is metabolic, likely related to probable UTI. Blood cultures have also been drawn to investigate for bacteremia. Her imaging exams have not revealed any acute intracranial or intrabdominal pathology and she does not have any localizing signs or symptoms of a respiratory infection. Of note, slightly elevated LFTs which are now downtrending might indicate a recovering viral syndrome or may be related to antibiotic use.  - c/w Levaquin, switched to IV, favor pivoting to a different regimen given non clinical response but will await for ID recommendations and some cultures result first  - ID consult, appreciate recs  - Placed on dysphagia diet, bedside swallow eval ordered  - f/u Bcx and UCx  - Will also send TSH, B12 and folate levels to investigate other causes of encephalopathy  - If no improvement with the appropriate antibiotic regimen, will consult neurology to further evaluate    # Seizures  - c/w levetiracetam and lamotrigine (home meds)  - seizure precautions     #Hepatocellular injury  -Mild and downtrending, suspect related to antibiotics vs. resolving viral syndrome  -CTM LFTs daily    # HTN  # HLD  - c/w amlodipine   - c/w statin  - monitor BP  - DASH diet    # Hypothyroidism  - c/w synthroid     Diet:  VTE PPx: heparin TID (Cre elevated for age suspect GFR lower than calculated)  PT/OT/Dispo:PT consulted, will follow recs  Lines: PIV  Hurtado:No hurtado in place  Surrogate: Daughter at the bedside    #Progress note handoff: Pending consults: ID, PT Tests: TSH, Test Results: Daily LFTs, follow UCx and BCx FamilyDiscussion:Daughter updated at the bedside Disposition:Pending improvement in mental status, PT to evaluate as well

## 2024-09-11 NOTE — H&P ADULT - PATIENT'S GENDER IDENTITY
Patient is alert and oriented, in bed resting. IV fluids are infusing. Dressing to left knee is clean/dry/intact. I oriented patient to the room and educated her on use of call light system. Bed is locked in lowest position, call light within reach. Will continue to monitor.   Electronically signed by Te Donald RN on 11/6/2018 at 1:49 PM Female

## 2024-09-11 NOTE — ED ADULT TRIAGE NOTE - CHIEF COMPLAINT QUOTE
Pt BIBA because amoxicillin from yesterdays ER visit for UTI is "not working"; as per family increased confusion.

## 2024-09-11 NOTE — CONSULT NOTE ADULT - ASSESSMENT
* THIS IS AN INCOMPLETE NOTE. FINAL RECOMMENDATION IS PENDING *   83 year old female past medical history of dementia, seizure, seizure, HTN, HLD, hypothyroidism comes to emergency room for AMS. patient in emergency room 2 days ago diagnosed with UTI and sent home with levaquin.     ID is consulted for UTI  Afebrile, WBC 8.46 > 7.71  On room air  9/9 UA WBC >50, family reported having hematuria, did not send UCx; discharged on PO Levaquin  COVID/flu/RSV negative    9/9 CXR questionable left basilar opacity    Antibiotics:  Levaquin 9/9 - 9/11      IMPRESSION:  Possible UTI  Dementia  Hx seizure  Drug allergy    RECOMMENDATIONS:  - D/C Levaquin, start IV ceftriaxone 1g q24hrs (aware of penicillin allergy, discussed with family, monitor for 1st dose reaction)  - Follow up UCx  - PT  - Offloading and frequent position changes, aspiration precaution  - Trend WBC, fever curve, transaminases, creatinine daily      Barbara Nunez D.O.  Attending Physician  Division of Infectious Diseases  [facility]  Please contact me via [employer] Teams

## 2024-09-11 NOTE — H&P ADULT - HISTORY OF PRESENT ILLNESS
83 year old female past medical history of dementia comes to emergency room for AMS. patient in emergency room 2 days ago diagnosed with UTI and sent home with levaquin.confused over past 4 days with hematuria, had CT head for confusion and CTAP  that showed no acute pathology .  brought in again by family for continued  symptoms  and also general weakness,  unable to walk without assistance,  still confused no falls.  no fever.  83 year old female past medical history of dementia, seizure comes to emergency room for AMS. patient in emergency room 2 days ago diagnosed with UTI and sent home with levaquin. confused over past 4 days with hematuria, had CT head for confusion and CTAP  that showed no acute pathology .  brought in again by family for continued  symptoms  and also general weakness,  unable to walk without assistance,  still confused no falls.  no fever.  83 year old female past medical history of dementia, seizure, seizure, HTN, HLD, hypothyroidism comes to emergency room for AMS. patient in emergency room 2 days ago diagnosed with UTI and sent home with levaquin. confused over past 4 days with hematuria, had CT head for confusion and CTAP  that showed no acute pathology .  brought in again by family for continued  symptoms  and also general weakness,  unable to walk without assistance,  still confused no falls.  no fever.

## 2024-09-11 NOTE — ED PROVIDER NOTE - OBJECTIVE STATEMENT
83 year old female past medical history of dementia comes to emergency room for AMS. patient in emergency room 2 days ago diagnosis with UTI and sent home with levaquin. patient since leaving increasing confusion and agitation and not eating nad drinking.

## 2024-09-11 NOTE — H&P ADULT - NSHPLABSRESULTS_GEN_ALL_CORE
LABS:  cret                        12.4   7.71  )-----------( 155      ( 11 Sep 2024 03:11 )             37.3     09-11    133<L>  |  96<L>  |  26<H>  ----------------------------<  119<H>  4.0   |  22  |  1.3    Ca    8.9      11 Sep 2024 03:11  Mg     2.0     09-11    TPro  6.7  /  Alb  3.9  /  TBili  0.5  /  DBili  x   /  AST  76<H>  /  ALT  69<H>  /  AlkPhos  94  09-11      RADIOLOGY:    CT Head No Cont (09.09.24 @ 20:35)       IMPRESSION:  1.  No evidence of acute intracranial pathology.

## 2024-09-11 NOTE — H&P ADULT - ASSESSMENT
# Altered mental status.   # Acute UTI. 83 year old female past medical history of dementia, seizure, HTN, HLD, hypothyroidism comes to emergency room for AMS. patient in emergency room 2 days ago diagnosed with UTI and sent home with levaquin. confused over past 4 days with hematuria, had CT head for confusion and CTAP  that showed no acute pathology .  brought in again by family for continued  symptoms      # Altered mental status.   # Acute UTI  - WBC WNL, afebrile  - c/w IVAB  - ID consult  - IVF  - f/u Bcx  - monitor VS  - repeat labs    # Seizures  - c/w levetiracetam and lamotrigine  - seizure precautions     # HTN  # HLD  - c/w amlodipine   - c/w statin  - monitor BP  - DASH diet    # Hypothyroidism  - c/w synthroid

## 2024-09-11 NOTE — CONSULT NOTE ADULT - SUBJECTIVE AND OBJECTIVE BOX
INFECTIOUS DISEASE CONSULT NOTE    Patient is a 83y old  Female who presents with a chief complaint of   HPI:  83 year old female past medical history of dementia, seizure, seizure, HTN, HLD, hypothyroidism comes to emergency room for AMS. patient in emergency room 2 days ago diagnosed with UTI and sent home with levaquin. confused over past 4 days with hematuria, had CT head for confusion and CTAP  that showed no acute pathology .  brought in again by family for continued  symptoms  and also general weakness,  unable to walk without assistance,  still confused no falls.  no fever.  (11 Sep 2024 05:27)         Prior hospital charts reviewed [Yes]  Primary team notes reviewed [Yes]  Other consultant notes reviewed [Yes]    REVIEW OF SYSTEMS:      PAST MEDICAL & SURGICAL HISTORY:  Depressive disorder  Depression      Hyperlipidemia  Hyperlipidemia      Essential hypertension  Hypertension      Seizure      H/O partial thyroidectomy          SOCIAL HISTORY:  - Born in _____, migrated to  in 19XX  - Currently working as / Retired  - Lives with _____; no pets  - No recent travel  - Denies tobacco use  - Denies alcohol use  - Denies illicit drug use  - Currently sexually active, has one male/female sexual partner    FAMILY HISTORY:  No pertinent family history in first degree relatives        Allergies:  penicillin (Unknown)  contrast media (iodine-based) (Unknown)  Iodides (Unknown)  amoxicillin (Unknown)      ANTIMICROBIALS:  levoFLOXacin IVPB 750 every 48 hours      ANTIMICROBIALS (past 90 days):  MEDICATIONS  (STANDING):  levoFLOXacin IVPB   50 mL/Hr IV Intermittent (09-11-24 @ 03:07)        OTHER MEDS:   MEDICATIONS  (STANDING):  acetaminophen     Tablet .. 650 every 6 hours PRN  aluminum hydroxide/magnesium hydroxide/simethicone Suspension 30 every 4 hours PRN  amLODIPine   Tablet 5 daily  aspirin enteric coated 81 daily  lamoTRIgine 100 two times a day  levETIRAcetam 750 two times a day  levothyroxine 88 daily  ondansetron Injectable 4 every 8 hours PRN  PARoxetine 30 daily  QUEtiapine 50 at bedtime  rosuvastatin 20 at bedtime      VITALS:  Vital Signs Last 24 Hrs  T(F): 97.9 (09-11-24 @ 05:30), Max: 97.9 (09-09-24 @ 19:43)    Vital Signs Last 24 Hrs  HR: 90 (09-11-24 @ 05:30) (90 - 91)  BP: 134/68 (09-11-24 @ 05:30) (109/58 - 134/68)  RR: 18 (09-11-24 @ 05:30)  SpO2: 95% (09-11-24 @ 05:30) (95% - 95%)  Wt(kg): --    EXAM:    Labs:                        12.4   7.71  )-----------( 155      ( 11 Sep 2024 03:11 )             37.3     09-11    133<L>  |  96<L>  |  26<H>  ----------------------------<  119<H>  4.0   |  22  |  1.3    Ca    8.9      11 Sep 2024 03:11  Mg     2.0     09-11    TPro  6.7  /  Alb  3.9  /  TBili  0.5  /  DBili  x   /  AST  76<H>  /  ALT  69<H>  /  AlkPhos  94  09-11      WBC Trend:  WBC Count: 7.71 (09-11-24 @ 03:11)  WBC Count: 8.46 (09-09-24 @ 19:55)      Auto Neutrophil #: 4.86 K/uL (09-11-24 @ 03:11)  Auto Neutrophil #: 4.41 K/uL (09-09-24 @ 19:55)      Creatine Trend:  Creatinine: 1.3 (09-11)  Creatinine: 1.2 (09-09)      Liver Biochemical Testing Trend:  Alanine Aminotransferase (ALT/SGPT): 69 *H* (09-11)  Alanine Aminotransferase (ALT/SGPT): 94 *H* (09-09)  Aspartate Aminotransferase (AST/SGOT): 76 (09-11-24 @ 03:11)  Aspartate Aminotransferase (AST/SGOT): 116 (09-09-24 @ 19:55)  Bilirubin Total: 0.5 (09-11)  Bilirubin Total: 0.4 (09-09)      Trend LDH      Auto Eosinophil %: 2.1 % (09-11-24 @ 03:11)  Auto Eosinophil %: 3.0 % (09-09-24 @ 19:55)      Urinalysis Basic - ( 11 Sep 2024 03:11 )    Color: x / Appearance: x / SG: x / pH: x  Gluc: 119 mg/dL / Ketone: x  / Bili: x / Urobili: x   Blood: x / Protein: x / Nitrite: x   Leuk Esterase: x / RBC: x / WBC x   Sq Epi: x / Non Sq Epi: x / Bacteria: x          MICROBIOLOGY:      Troponin T, High Sensitivity Result: 12 (09-09)    Lactate, Blood: 1.4 (09-11 @ 03:11)          RADIOLOGY:  imaging below personally reviewed    < from: CT Abdomen and Pelvis No Cont (09.09.24 @ 20:35) >  IMPRESSION:  No CT evidence of an acute abdominopelvic pathology.    < end of copied text >    < from: CT Head No Cont (09.09.24 @ 20:35) >  IMPRESSION:  1.  No evidence of acute intracranial pathology.    < end of copied text >    < from: Xray Chest 1 View-PORTABLE IMMEDIATE (09.09.24 @ 20:17) >  Impression:    Questionable faint left basilar opacities.    < end of copied text >   INFECTIOUS DISEASE CONSULT NOTE    Patient is a 83y old  Female who presents with a chief complaint of UTI    HPI:  83 year old female past medical history of dementia, seizure, seizure, HTN, HLD, hypothyroidism comes to emergency room for AMS. patient in emergency room 2 days ago diagnosed with UTI and sent home with levaquin. confused over past 4 days with hematuria, had CT head for confusion and CTAP  that showed no acute pathology .  brought in again by family for continued  symptoms  and also general weakness,  unable to walk without assistance,  still confused no falls.  no fever.  (11 Sep 2024 05:27)     Prior hospital charts reviewed [Yes]  Primary team notes reviewed [Yes]  Other consultant notes reviewed [Yes]    REVIEW OF SYSTEMS:  Limited due to cognitive impairment      PAST MEDICAL & SURGICAL HISTORY:  Depressive disorder  Depression      Hyperlipidemia  Hyperlipidemia      Essential hypertension  Hypertension      Seizure      H/O partial thyroidectomy    SOCIAL HISTORY:  - No recent travel  - Denies tobacco use  - Denies alcohol use  - Denies illicit drug use    FAMILY HISTORY:  No family history of parkinson's disease      Allergies:  penicillin (Unknown)  contrast media (iodine-based) (Unknown)  Iodides (Unknown)  amoxicillin (Unknown)      ANTIMICROBIALS:  levoFLOXacin IVPB 750 every 48 hours      ANTIMICROBIALS (past 90 days):  MEDICATIONS  (STANDING):  levoFLOXacin IVPB   50 mL/Hr IV Intermittent (09-11-24 @ 03:07)        OTHER MEDS:   MEDICATIONS  (STANDING):  acetaminophen     Tablet .. 650 every 6 hours PRN  aluminum hydroxide/magnesium hydroxide/simethicone Suspension 30 every 4 hours PRN  amLODIPine   Tablet 5 daily  aspirin enteric coated 81 daily  lamoTRIgine 100 two times a day  levETIRAcetam 750 two times a day  levothyroxine 88 daily  ondansetron Injectable 4 every 8 hours PRN  PARoxetine 30 daily  QUEtiapine 50 at bedtime  rosuvastatin 20 at bedtime      VITALS:  Vital Signs Last 24 Hrs  T(F): 97.9 (09-11-24 @ 05:30), Max: 97.9 (09-09-24 @ 19:43)    Vital Signs Last 24 Hrs  HR: 90 (09-11-24 @ 05:30) (90 - 91)  BP: 134/68 (09-11-24 @ 05:30) (109/58 - 134/68)  RR: 18 (09-11-24 @ 05:30)  SpO2: 95% (09-11-24 @ 05:30) (95% - 95%)  Wt(kg): --    EXAM:  GENERAL: NAD, lying in bed, frail  HEAD: No head lesions  EYES: Conjunctiva pink and cornea white  EAR, NOSE, MOUTH, THROAT: Normal external ears and nose, no discharges; moist mucous membranes  NECK: Supple, nontender to palpation; no JVD  RESPIRATORY: Clear to auscultation bilaterally  CARDIOVASCULAR: S1 S2  GASTROINTESTINAL: Soft, nontender, nondistended; normoactive bowel sounds  GENITOURINARY: No hurtado catheter, no CVA tenderness  EXTREMITIES: No clubbing, cyanosis, or petal edema  NERVOUS SYSTEM: Awake and alert, not fully oriented.  MUSCULOSKELETAL: No joint erythema, swelling or pain  SKIN: No rashes or lesions, no superficial thrombophlebitis  PSYCH: Normal affect    Labs:                        12.4   7.71  )-----------( 155      ( 11 Sep 2024 03:11 )             37.3     09-11    133<L>  |  96<L>  |  26<H>  ----------------------------<  119<H>  4.0   |  22  |  1.3    Ca    8.9      11 Sep 2024 03:11  Mg     2.0     09-11    TPro  6.7  /  Alb  3.9  /  TBili  0.5  /  DBili  x   /  AST  76<H>  /  ALT  69<H>  /  AlkPhos  94  09-11      WBC Trend:  WBC Count: 7.71 (09-11-24 @ 03:11)  WBC Count: 8.46 (09-09-24 @ 19:55)      Auto Neutrophil #: 4.86 K/uL (09-11-24 @ 03:11)  Auto Neutrophil #: 4.41 K/uL (09-09-24 @ 19:55)      Creatine Trend:  Creatinine: 1.3 (09-11)  Creatinine: 1.2 (09-09)      Liver Biochemical Testing Trend:  Alanine Aminotransferase (ALT/SGPT): 69 *H* (09-11)  Alanine Aminotransferase (ALT/SGPT): 94 *H* (09-09)  Aspartate Aminotransferase (AST/SGOT): 76 (09-11-24 @ 03:11)  Aspartate Aminotransferase (AST/SGOT): 116 (09-09-24 @ 19:55)  Bilirubin Total: 0.5 (09-11)  Bilirubin Total: 0.4 (09-09)      Trend LDH      Auto Eosinophil %: 2.1 % (09-11-24 @ 03:11)  Auto Eosinophil %: 3.0 % (09-09-24 @ 19:55)      Urinalysis Basic - ( 11 Sep 2024 03:11 )    Color: x / Appearance: x / SG: x / pH: x  Gluc: 119 mg/dL / Ketone: x  / Bili: x / Urobili: x   Blood: x / Protein: x / Nitrite: x   Leuk Esterase: x / RBC: x / WBC x   Sq Epi: x / Non Sq Epi: x / Bacteria: x          MICROBIOLOGY:      Troponin T, High Sensitivity Result: 12 (09-09)    Lactate, Blood: 1.4 (09-11 @ 03:11)          RADIOLOGY:  imaging below personally reviewed    < from: CT Abdomen and Pelvis No Cont (09.09.24 @ 20:35) >  IMPRESSION:  No CT evidence of an acute abdominopelvic pathology.    < end of copied text >    < from: CT Head No Cont (09.09.24 @ 20:35) >  IMPRESSION:  1.  No evidence of acute intracranial pathology.    < end of copied text >    < from: Xray Chest 1 View-PORTABLE IMMEDIATE (09.09.24 @ 20:17) >  Impression:    Questionable faint left basilar opacities.    < end of copied text >

## 2024-09-11 NOTE — ED PROVIDER NOTE - PHYSICAL EXAMINATION
Physical Exam    Vital Signs: I have reviewed the initial vital signs.  Constitutional:   no acute distress  Eyes: Conjunctiva pink, Sclera clear, PERRLA, EOMI.  Cardiovascular: S1 and S2, regular rate, regular rhythm, well-perfused extremities, radial pulses equal and 2+  Respiratory: unlabored respiratory effort, clear to auscultation bilaterally no wheezing, rales and rhonchi  Gastrointestinal: soft, non-tender abdomen, no pulsatile mass, normal bowl sounds  Musculoskeletal: supple neck, no lower extremity edema, no midline tenderness  Integumentary: warm, dry, no rash  Neurologic: awake, alert

## 2024-09-11 NOTE — ED PROVIDER NOTE - CLINICAL SUMMARY MEDICAL DECISION MAKING FREE TEXT BOX
83yF  hx  dementia  , confused over past 4 days with hematuria,  dxd in ED  yesterday with UTI,  starte don levafquin, had CT head for confusion and CTAP  that showed no acute pathology .  brought in by family for continues  sym,ptoms  and also generally weak,  unable to walk without assistance,  still confused no falls.  no fever. Pe alert nontoxic unchanged from  yesterday .    Labs resulted at the time of my review were noted to be within acceptable parameters wbc 7 LA,2

## 2024-09-11 NOTE — PATIENT PROFILE ADULT - MEDICATIONS/VISITS
OFFICE VISIT    History    Mae Hoffman is a 43 year old female with past medical history of obesity   who presents today for :    Vaginal discharge and some discomfort x 1 week. Discharge is clear but there is a bit of an odor.  Menses are regular, LMP early October normal flow, normal duration. No pelvic pain. She is sexually active. She has a rash on her buttocks as well that itches. Notes it itched before it started. Rash is improving. She has been applying lotion.    Exercise: jogs 4x weekly x 30 minutes, does sit ups  Diet: healthy, fruits and vegetables, home cooked meals, chicken and fish most days.      10 point ROS reviewed and negative except as documented above.      Health Maintenance Due   Topic Date Due   • COVID-19 Vaccine (3 - Booster for Pfizer series) 2021     Current Outpatient Medications   Medication Sig Dispense Refill   • COVID-19 mRNA, Pfizer, 30 MCG/0.3ML vaccine Inject 0.3 mLs into the muscle. 0.3 mL 1   • ondansetron (Zofran) 4 MG tablet Take 1 tablet by mouth every 12 hours as needed for Nausea. 10 tablet 0   • albuterol 108 (90 Base) MCG/ACT inhaler Inhale 2 puffs into the lungs every 4 hours as needed for Shortness of Breath or Wheezing. 1 each 0   • Multiple Vitamin (DAILY VITAMINS PO)        No current facility-administered medications for this visit.     ALLERGIES:  No Known Allergies  Past Medical History:   Diagnosis Date   • Cystic acne    • STD (sexually transmitted disease)      Past Surgical History:   Procedure Laterality Date   •  delivery+postpartum care  10/05/2004       • Tubal ligation  2004     Family History   Problem Relation Age of Onset   • Diabetes Father    • Stroke Father    • Cancer Paternal Aunt 45        breast      Social History     Socioeconomic History   • Marital status: Single     Spouse name: Not on file   • Number of children: Not on file   • Years of education: Not on file   • Highest education level: Not on file    Occupational History   • Not on file   Tobacco Use   • Smoking status: Never Smoker   • Smokeless tobacco: Never Used   Substance and Sexual Activity   • Alcohol use: No     Alcohol/week: 0.0 standard drinks   • Drug use: No   • Sexual activity: Yes     Partners: Male     Birth control/protection: Condom   Other Topics Concern   • Not on file   Social History Narrative    - Diet: toast, eggs; sandwich, chips, soda; mac n cheese, fast meals    - Exercise: 3x week, jogs for 40 minutes    - Sleep: sleeps well, wakes up refreshed    - at a plant, single, 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, 13, 15, 16     Social Determinants of Health     Financial Resource Strain: Not on file   Food Insecurity: Not on file   Transportation Needs: Not on file   Physical Activity: Not on file   Stress: Not on file   Social Connections: Not on file   Intimate Partner Violence: Not on file           Physical Exam      Vital Signs:    Visit Vitals  /76   Pulse 82   Resp 12   Ht 5' 2\" (1.575 m)   Wt 82.6 kg (182 lb)   LMP 10/01/2022   SpO2 99%   BMI 33.29 kg/m²     Physical Exam:    Vital Signs:    Visit Vitals  /76   Pulse 82   Resp 12   Ht 5' 2\" (1.575 m)   Wt 82.6 kg (182 lb)   LMP 10/01/2022   SpO2 99%   BMI 33.29 kg/m²     Pulse Ox Interpretation:  Pulse ox not performed  General:  Alert, cooperative, conversive.  Skin:  Warm and dry without rash.  Right upper buttock with area of erythema about 2 by 2 inches with some satellite lesions, no visible blisters but pt reports there were blisters  Head:  Normocephalic-atraumatic.   Neck:  Trachea is midline. No adenopathy.  Normal thyroid without mass or tenderness..   Eyes:  Normal conjunctivae and sclerae.  Pupils equal, round, reactive to light.  Extraocular movements intact.  ENT:  Mucous membranes are moist.  Normal tympanic membranes and external auditory canals bilaterally.  No pharyngeal erythema or exudate.  No facial tenderness.  Normal nasal  mucosa.  Cardiovascular:  Symmetrical pulses.  Regular, rate and rhythm without murmur.  Respiratory:  Normal respiratory effort.  Clear to auscultation.  No wheezes, rales or rhonchi.  Gastrointestinal:  Soft and nontender.  Normal bowel sounds.  No hepatomegaly or splenomegaly.     Genitourinary (female): Normal external genitalia. Cervical os without lesions or discharge. Uterus is not enlarged or tender. No adnexal masses or tenderness.  Neurologic:   Oriented times 4.  Cranial nerves 2-12 are intact.  No focal deficits or lateralizing signs.  Psychiatric:   Cooperative.  Appropriate mood and affect.    Assessment & Plan   Orders Placed This Encounter   • SwabOne Vaginitis Panel   • Chlamydia/Gonorrhea by Nucleic Acid Amplification     No follow-ups on file.    Vaginal discharge  Await test results, further recs to follow  - SWABONE VAGINITIS PANEL  - CHLAMYDIA/GONORRHEA BY NUCLEIC ACID AMPLIFICATION    Herpes zoster without complication  Rash  Suspect zoster, apply bactroban to avoid secondary bacterial infection  Red flag signs and symptoms discussed, patient understands follow up instructions              Pap 11/18 neg/neg  Mammogram wnl 2021    Follow-up instructions provided.  Red flags discussed.    Instructed patient on correct medication dosing, usage and adverse effects (if applicable).  All questions and concerns discussed.   Patient agreeable to plan.      Silvia Kothari MD  Family Medicine  [address] [place] [address], [address]  [place] [address]  Telephone: [phone]    Fax: [phone]   no

## 2024-09-12 LAB
ALBUMIN SERPL ELPH-MCNC: 3.6 G/DL — SIGNIFICANT CHANGE UP (ref 3.5–5.2)
ALP SERPL-CCNC: 90 U/L — SIGNIFICANT CHANGE UP (ref 30–115)
ALT FLD-CCNC: 53 U/L — HIGH (ref 0–41)
ANION GAP SERPL CALC-SCNC: 10 MMOL/L — SIGNIFICANT CHANGE UP (ref 7–14)
AST SERPL-CCNC: 57 U/L — HIGH (ref 0–41)
BASOPHILS # BLD AUTO: 0.03 K/UL — SIGNIFICANT CHANGE UP (ref 0–0.2)
BASOPHILS NFR BLD AUTO: 0.5 % — SIGNIFICANT CHANGE UP (ref 0–1)
BILIRUB SERPL-MCNC: 0.3 MG/DL — SIGNIFICANT CHANGE UP (ref 0.2–1.2)
BUN SERPL-MCNC: 17 MG/DL — SIGNIFICANT CHANGE UP (ref 10–20)
CALCIUM SERPL-MCNC: 8.6 MG/DL — SIGNIFICANT CHANGE UP (ref 8.4–10.5)
CHLORIDE SERPL-SCNC: 105 MMOL/L — SIGNIFICANT CHANGE UP (ref 98–110)
CO2 SERPL-SCNC: 26 MMOL/L — SIGNIFICANT CHANGE UP (ref 17–32)
CREAT SERPL-MCNC: 1 MG/DL — SIGNIFICANT CHANGE UP (ref 0.7–1.5)
CULTURE RESULTS: NO GROWTH — SIGNIFICANT CHANGE UP
CULTURE RESULTS: SIGNIFICANT CHANGE UP
EGFR: 56 ML/MIN/1.73M2 — LOW
EOSINOPHIL # BLD AUTO: 0.19 K/UL — SIGNIFICANT CHANGE UP (ref 0–0.7)
EOSINOPHIL NFR BLD AUTO: 3.2 % — SIGNIFICANT CHANGE UP (ref 0–8)
GLUCOSE SERPL-MCNC: 90 MG/DL — SIGNIFICANT CHANGE UP (ref 70–99)
HCT VFR BLD CALC: 36.3 % — LOW (ref 37–47)
HGB BLD-MCNC: 12.2 G/DL — SIGNIFICANT CHANGE UP (ref 12–16)
IMM GRANULOCYTES NFR BLD AUTO: 0.2 % — SIGNIFICANT CHANGE UP (ref 0.1–0.3)
LYMPHOCYTES # BLD AUTO: 2.32 K/UL — SIGNIFICANT CHANGE UP (ref 1.2–3.4)
LYMPHOCYTES # BLD AUTO: 39.6 % — SIGNIFICANT CHANGE UP (ref 20.5–51.1)
MCHC RBC-ENTMCNC: 29.4 PG — SIGNIFICANT CHANGE UP (ref 27–31)
MCHC RBC-ENTMCNC: 33.6 G/DL — SIGNIFICANT CHANGE UP (ref 32–37)
MCV RBC AUTO: 87.5 FL — SIGNIFICANT CHANGE UP (ref 81–99)
MONOCYTES # BLD AUTO: 0.68 K/UL — HIGH (ref 0.1–0.6)
MONOCYTES NFR BLD AUTO: 11.6 % — HIGH (ref 1.7–9.3)
NEUTROPHILS # BLD AUTO: 2.63 K/UL — SIGNIFICANT CHANGE UP (ref 1.4–6.5)
NEUTROPHILS NFR BLD AUTO: 44.9 % — SIGNIFICANT CHANGE UP (ref 42.2–75.2)
NRBC # BLD: 0 /100 WBCS — SIGNIFICANT CHANGE UP (ref 0–0)
PLATELET # BLD AUTO: 145 K/UL — SIGNIFICANT CHANGE UP (ref 130–400)
PMV BLD: 10.3 FL — SIGNIFICANT CHANGE UP (ref 7.4–10.4)
POTASSIUM SERPL-MCNC: 4.1 MMOL/L — SIGNIFICANT CHANGE UP (ref 3.5–5)
POTASSIUM SERPL-SCNC: 4.1 MMOL/L — SIGNIFICANT CHANGE UP (ref 3.5–5)
PROT SERPL-MCNC: 6.4 G/DL — SIGNIFICANT CHANGE UP (ref 6–8)
RBC # BLD: 4.15 M/UL — LOW (ref 4.2–5.4)
RBC # FLD: 13 % — SIGNIFICANT CHANGE UP (ref 11.5–14.5)
SODIUM SERPL-SCNC: 141 MMOL/L — SIGNIFICANT CHANGE UP (ref 135–146)
SPECIMEN SOURCE: SIGNIFICANT CHANGE UP
SPECIMEN SOURCE: SIGNIFICANT CHANGE UP
TSH SERPL-MCNC: 0.84 UIU/ML — SIGNIFICANT CHANGE UP (ref 0.27–4.2)
WBC # BLD: 5.86 K/UL — SIGNIFICANT CHANGE UP (ref 4.8–10.8)
WBC # FLD AUTO: 5.86 K/UL — SIGNIFICANT CHANGE UP (ref 4.8–10.8)

## 2024-09-12 PROCEDURE — 99233 SBSQ HOSP IP/OBS HIGH 50: CPT

## 2024-09-12 RX ADMIN — ROSUVASTATIN CALCIUM 20 MILLIGRAM(S): 10 TABLET ORAL at 21:49

## 2024-09-12 RX ADMIN — Medication 100 MILLIGRAM(S): at 17:22

## 2024-09-12 RX ADMIN — SODIUM CHLORIDE 75 MILLILITER(S): 9 INJECTION INTRAMUSCULAR; INTRAVENOUS; SUBCUTANEOUS at 12:09

## 2024-09-12 RX ADMIN — LEVETIRACETAM 750 MILLIGRAM(S): 1000 TABLET ORAL at 17:22

## 2024-09-12 RX ADMIN — SODIUM CHLORIDE 75 MILLILITER(S): 9 INJECTION INTRAMUSCULAR; INTRAVENOUS; SUBCUTANEOUS at 05:30

## 2024-09-12 RX ADMIN — LATANOPROST 1 DROP(S): 50 SOLUTION OPHTHALMIC at 21:52

## 2024-09-12 RX ADMIN — Medication 81 MILLIGRAM(S): at 12:09

## 2024-09-12 RX ADMIN — Medication 50 MILLIGRAM(S): at 21:49

## 2024-09-12 RX ADMIN — LAMOTRIGINE 100 MILLIGRAM(S): 100 TABLET, EXTENDED RELEASE ORAL at 17:22

## 2024-09-12 RX ADMIN — SODIUM CHLORIDE 75 MILLILITER(S): 9 INJECTION INTRAMUSCULAR; INTRAVENOUS; SUBCUTANEOUS at 17:22

## 2024-09-12 RX ADMIN — PAROXETINE 30 MILLIGRAM(S): 10 TABLET, FILM COATED ORAL at 12:09

## 2024-09-12 NOTE — PHYSICAL THERAPY INITIAL EVALUATION ADULT - ADDITIONAL COMMENTS
pt lives with her  in a private house with 5 steps to enter and 9 steps to bedroom and bathroom area with rails.  Pt was independent community ambulator prior to admission without AD

## 2024-09-12 NOTE — PROGRESS NOTE ADULT - SUBJECTIVE AND OBJECTIVE BOX
INFECTIOUS DISEASE FOLLOW UP NOTE:    Interval History/ROS: Patient is a 83y old  Female who presents with a chief complaint of UTI (12 Sep 2024 07:13)    Overnight events:  at bedside. Reports more awake today, but not eating well.    REVIEW OF SYSTEMS:  Unable to provide due to cognitive impairment      Prior hospital charts reviewed [Yes]  Primary team notes reviewed [Yes]  Other consultant notes reviewed [Yes]    Allergies:  penicillin (Unknown)  contrast media (iodine-based) (Unknown)  Iodides (Unknown)  amoxicillin (Unknown)      ANTIMICROBIALS:   cefTRIAXone   IVPB 1000 every 24 hours      OTHER MEDS: MEDICATIONS  (STANDING):  acetaminophen     Tablet .. 650 every 6 hours PRN  aluminum hydroxide/magnesium hydroxide/simethicone Suspension 30 every 4 hours PRN  amLODIPine   Tablet 5 daily  aspirin enteric coated 81 daily  heparin   Injectable 5000 every 12 hours  lamoTRIgine 100 two times a day  levETIRAcetam 750 two times a day  levothyroxine 88 daily  ondansetron Injectable 4 every 8 hours PRN  PARoxetine 30 daily  QUEtiapine 50 at bedtime  rosuvastatin 20 at bedtime      Vital Signs Last 24 Hrs  T(F): 97.4 (09-12-24 @ 13:02), Max: 98.1 (09-11-24 @ 20:56)    Vital Signs Last 24 Hrs  HR: 85 (09-12-24 @ 13:02) (80 - 86)  BP: 144/69 (09-12-24 @ 13:02) (131/53 - 144/69)  RR: 18 (09-12-24 @ 13:02)  SpO2: 96% (09-12-24 @ 04:45) (93% - 96%)  Wt(kg): --    EXAM:  GENERAL: NAD, lying in bed, frail  HEAD: No head lesions  EYES: Conjunctiva pink and cornea white  EAR, NOSE, MOUTH, THROAT: Normal external ears and nose, no discharges; moist mucous membranes  NECK: Supple, nontender to palpation; no JVD  RESPIRATORY: Clear to auscultation bilaterally  CARDIOVASCULAR: S1 S2  GASTROINTESTINAL: Soft, nontender, nondistended; normoactive bowel sounds  GENITOURINARY: No hurtado catheter, no CVA tenderness  EXTREMITIES: No clubbing, cyanosis, or petal edema  NERVOUS SYSTEM: Awake and alert, not fully oriented.  MUSCULOSKELETAL: No joint erythema, swelling or pain  SKIN: No rashes or lesions, no superficial thrombophlebitis  PSYCH: Normal affect    Labs:                        12.2   5.86  )-----------( 145      ( 12 Sep 2024 06:56 )             36.3     09-12    141  |  105  |  17  ----------------------------<  90  4.1   |  26  |  1.0    Ca    8.6      12 Sep 2024 06:56  Mg     2.0     09-11    TPro  6.4  /  Alb  3.6  /  TBili  0.3  /  DBili  x   /  AST  57<H>  /  ALT  53<H>  /  AlkPhos  90  09-12      WBC Trend:  WBC Count: 5.86 (09-12-24 @ 06:56)  WBC Count: 6.96 (09-11-24 @ 12:34)  WBC Count: 7.71 (09-11-24 @ 03:11)  WBC Count: 8.46 (09-09-24 @ 19:55)      Creatine Trend:  Creatinine: 1.0 (09-12)  Creatinine: 1.3 (09-11)  Creatinine: 1.2 (09-09)      Liver Biochemical Testing Trend:  Alanine Aminotransferase (ALT/SGPT): 53 *H* (09-12)  Alanine Aminotransferase (ALT/SGPT): 69 *H* (09-11)  Alanine Aminotransferase (ALT/SGPT): 94 *H* (09-09)  Aspartate Aminotransferase (AST/SGOT): 57 (09-12-24 @ 06:56)  Aspartate Aminotransferase (AST/SGOT): 76 (09-11-24 @ 03:11)  Aspartate Aminotransferase (AST/SGOT): 116 (09-09-24 @ 19:55)  Bilirubin Total: 0.3 (09-12)  Bilirubin Total: 0.5 (09-11)  Bilirubin Total: 0.4 (09-09)      Trend LDH      Urinalysis Basic - ( 12 Sep 2024 06:56 )    Color: x / Appearance: x / SG: x / pH: x  Gluc: 90 mg/dL / Ketone: x  / Bili: x / Urobili: x   Blood: x / Protein: x / Nitrite: x   Leuk Esterase: x / RBC: x / WBC x   Sq Epi: x / Non Sq Epi: x / Bacteria: x        MICROBIOLOGY:        Culture - Blood (collected 11 Sep 2024 03:11)  Source: .Blood Blood  Preliminary Report:    No growth at 24 hours    Culture - Blood (collected 11 Sep 2024 03:11)  Source: .Blood Blood  Preliminary Report:    No growth at 24 hours    Culture - Urine (collected 09 Sep 2024 19:55)  Source: Clean Catch Clean Catch (Midstream)  Final Report:    <10,000 CFU/mL Normal Urogenital Lili      Troponin T, High Sensitivity Result: 12 (09-09)    Lactate, Blood: 1.4 (09-11 @ 03:11)      RADIOLOGY:  imaging below personally reviewed    < from: CT Abdomen and Pelvis No Cont (09.09.24 @ 20:35) >    IMPRESSION:  No CT evidence of an acute abdominopelvic pathology.    < end of copied text >    < from: CT Head No Cont (09.09.24 @ 20:35) >  IMPRESSION:  1.  No evidence of acute intracranial pathology.    < end of copied text >    < from: Xray Chest 1 View-PORTABLE IMMEDIATE (09.09.24 @ 20:17) >    Impression:    Questionable faint left basilar opacities.        < end of copied text >

## 2024-09-12 NOTE — PHYSICAL THERAPY INITIAL EVALUATION ADULT - PERTINENT HX OF CURRENT PROBLEM, REHAB EVAL
83 year old female past medical history of dementia, seizure, seizure, HTN, HLD, hypothyroidism comes to emergency room for AMS. patient in emergency room 2 days ago diagnosed with UTI and sent home with levaquin. confused over past 4 days with hematuria, had CT head for confusion and CTAP  that showed no acute pathology .  brought in again by family for continued  symptoms  and also general weakness,  unable to walk without assistance,  still confused no falls.  no fever.

## 2024-09-12 NOTE — PROGRESS NOTE ADULT - ASSESSMENT
83 year old female past medical history of dementia, seizure, seizure, HTN, HLD, hypothyroidism comes to emergency room for AMS. patient in emergency room 2 days ago diagnosed with UTI and sent home with levaquin.     ID is consulted for UTI  Afebrile, WBC 8.46 > 7.71  On room air  9/9 UA WBC >50, family reported having hematuria, did not send UCx; discharged on PO Levaquin  COVID/flu/RSV negative  Repeat UCx showing normal nola    9/9 CXR questionable left basilar opacity    Antibiotics:  Levaquin 9/9 - 9/11      IMPRESSION:  Possible UTI  Dementia  Hx seizure  Constipation  Drug allergy    RECOMMENDATIONS:  - Continue IV ceftriaxone 1g q24hrs, tolerated it well. Treat for 2 more days  - PT  - Bowel reigmen  - Will sign off. Please recall ID PRN for further questions      Barbara Nunez D.O.  Attending Physician  Division of Infectious Diseases  [facility]  Please contact me via [employer] Teams

## 2024-09-12 NOTE — PHYSICAL THERAPY INITIAL EVALUATION ADULT - GENERAL OBSERVATIONS, REHAB EVAL
8;45-9;10 pt was seen for PT IE at bed side, pt is agreeable, chart thoroughly reviewed, RN Jason is aware.  Pt was received semi burkett in bed, in no apparent distress, +hep lock, +call bell within reach, bed side table at reach,  is at bed side

## 2024-09-12 NOTE — PROGRESS NOTE ADULT - SUBJECTIVE AND OBJECTIVE BOX
LYLE VANCE  [facility] ([facility])            Patient was evaluated and examined  by bedside,                 REVIEW OF SYSTEMS:  please see pertinent positives mentioned above, all other 12 ROS negative      T(C): , Max: 36.7 (09-11-24 @ 20:56)  HR: 86 (09-12-24 @ 04:45)  BP: 135/71 (09-12-24 @ 04:45)  RR: 18 (09-12-24 @ 04:45)  SpO2: 96% (09-12-24 @ 04:45)  CAPILLARY BLOOD GLUCOSE          PHYSICAL EXAM:  General: tearful, AOx1 (place)  HEENT: AT, NC, Supple  Lungs: CTA B/L  CVS: RRR  Abdomen: soft, bowel sounds present, non-tender, non-distended  Extremities: no edema    LAB  CBC  Date: 09-11-24 @ 12:34  Mean cell Fqvqibilby98.4  Mean cell Hemoglobin Conc33.8  Mean cell Volum 87.0  Platelet count-Automate 156  RBC Count 4.08  Red Cell Distrib Width12.7  WBC Count6.96  % Albumin, Urine--  Hematocrit 35.5  Hemoglobin 12.0  CBC  Date: 09-11-24 @ 03:11  Mean cell Torzaneyac76.9  Mean cell Hemoglobin Conc33.2  Mean cell Volum 86.9  Platelet count-Automate 155  RBC Count 4.29  Red Cell Distrib Width12.7  WBC Count7.71  % Albumin, Urine--  Hematocrit 37.3  Hemoglobin 12.4  CBC  Date: 09-09-24 @ 19:55  Mean cell Jjcbspdeaj49.1  Mean cell Hemoglobin Conc32.9  Mean cell Volum 88.3  Platelet count-Automate 187  RBC Count 4.71  Red Cell Distrib Width12.7  WBC Count8.46  % Albumin, Urine--  Hematocrit 41.6  Hemoglobin 13.7    BMP  09-11-24 @ 03:11  Blood Gas Arterial-Calcium,Ionized--  Blood Urea Nitrogen, Serum 26 mg/dL<H> [10 - 20]  Carbon Dioxide, Serum22 mmol/L [17 - 32]  Chloride, Serum96 mmol/L<L> [98 - 110]  Creatinie, Serum1.3 mg/dL [0.7 - 1.5]  Glucose, Xpgie719 mg/dL<H> [70 - 99]  Potassium, Serum4.0 mmol/L [3.5 - 5.0]  Sodium, Serum 133 mmol/L<L> [135 - 146]  [facility]  09-09-24 @ 19:55  Blood Gas Arterial-Calcium,Ionized--  Blood Urea Nitrogen, Serum 35 mg/dL<H> [10 - 20]  Carbon Dioxide, Serum25 mmol/L [17 - 32]  Chloride, Nywwl995 mmol/L [98 - 110]  Creatinie, Serum1.2 mg/dL [0.7 - 1.5]  Glucose, Yitvl184 mg/dL<H> [70 - 99]  Potassium, Serum4.2 mmol/L [3.5 - 5.0]  Sodium, Serum 139 mmol/L [135 - 146]              Microbiology:    Culture - Urine (collected 09-09-24 @ 19:55)  Source: Clean Catch Clean Catch (Midstream)  Final Report (09-12-24 @ 00:23):    <10,000 CFU/mL Normal Urogenital Lili        RADIOLOGY & ADDITIONAL TESTS:        Medications:  acetaminophen     Tablet .. 650 milliGRAM(s) Oral every 6 hours PRN  aluminum hydroxide/magnesium hydroxide/simethicone Suspension 30 milliLiter(s) Oral every 4 hours PRN  amLODIPine   Tablet 5 milliGRAM(s) Oral daily  aspirin enteric coated 81 milliGRAM(s) Oral daily  cefTRIAXone   IVPB 1000 milliGRAM(s) IV Intermittent every 24 hours  heparin   Injectable 5000 Unit(s) SubCutaneous every 12 hours  lamoTRIgine 100 milliGRAM(s) Oral two times a day  latanoprost 0.005% Ophthalmic Solution 1 Drop(s) Both EYES at bedtime  levETIRAcetam 750 milliGRAM(s) Oral two times a day  levothyroxine 88 MICROGram(s) Oral daily  ondansetron Injectable 4 milliGRAM(s) IV Push every 8 hours PRN  PARoxetine 30 milliGRAM(s) Oral daily  QUEtiapine 50 milliGRAM(s) Oral at bedtime  rosuvastatin 20 milliGRAM(s) Oral at bedtime  sodium chloride 0.9%. 1000 milliLiter(s) IV Continuous <Continuous>        Assessment and Plan:  83 year old female past medical history of dementia, seizure, HTN, HLD, hypothyroidism comes to emergency room for AMS. patient in emergency room 2 days ago diagnosed with UTI and sent home with levaquin. confused over past 4 days with hematuria, had CT head for confusion and CTAP  that showed no acute pathology .  brought in again by family for continued  symptoms      # Acute metabolic encephalopathy  Patient alert and oriented to self and place at baseline, able to recognize her daughter and able to perform basic ADls with minimal support. Since Monday, she has been feeling more weak, unable to perform ADls and more disoriented, particularly to place. Typically not oriented in time. No clear neurologic deficits on exam otherwise. Suspect the etiology is metabolic, likely related to probable UTI. Blood cultures have also been drawn to investigate for bacteremia. Her imaging exams have not revealed any acute intracranial or intrabdominal pathology and she does not have any localizing signs or symptoms of a respiratory infection. Of note, slightly elevated LFTs which are now downtrending might indicate a recovering viral syndrome or may be related to antibiotic use.  - D/benjamin Levaquin, started Ceftriaxone (9/12) given lack of response  - ID consult, appreciate recs  - Placed on dysphagia diet, bedside swallow eval ordered  - UCx unremarkable however patient was on antibiotics at that time. BCx pending  - Follow TSH, B12 and folate levels to investigate other causes of encephalopathy  - If no improvement with the appropriate antibiotic regimen, will consult neurology to further evaluate    # Seizures  - c/w levetiracetam and lamotrigine (home meds)  - seizure precautions     #Hepatocellular injury  -Mild and downtrending, suspect related to antibiotics vs. resolving viral syndrome  -CTM LFTs daily    # HTN  # HLD  - c/w amlodipine   - c/w statin  - monitor BP  - DASH diet    # Hypothyroidism  - c/w synthroid     Diet:  VTE PPx: heparin TID (Cre elevated for age suspect GFR lower than calculated)  PT/OT/Dispo:PT consulted, will follow recs  Lines: PIV  Hurtado:No hurtado in place  Surrogate: Daughter at the bedside       [facility]            Patient was evaluated and examined  by bedside, appears much improved 24 hours after initiation of antibiotics in terms of mentation.                 REVIEW OF SYSTEMS:  please see pertinent positives mentioned above, all other 12 ROS negative      T(C): , Max: 36.7 (09-11-24 @ 20:56)  HR: 86 (09-12-24 @ 04:45)  BP: 135/71 (09-12-24 @ 04:45)  RR: 18 (09-12-24 @ 04:45)  SpO2: 96% (09-12-24 @ 04:45)  CAPILLARY BLOOD GLUCOSE          PHYSICAL EXAM:  General: tearful, AOx1 (place)  HEENT: AT, NC, Supple  Lungs: CTA B/L  CVS: RRR  Abdomen: soft, bowel sounds present, non-tender, non-distended  Extremities: no edema    LAB  CBC  Date: 09-11-24 @ 12:34  Mean cell Zzuxpadfxc57.4  Mean cell Hemoglobin Conc33.8  Mean cell Volum 87.0  Platelet count-Automate 156  RBC Count 4.08  Red Cell Distrib Width12.7  WBC Count6.96  % Albumin, Urine--  Hematocrit 35.5  Hemoglobin 12.0  CBC  Date: 09-11-24 @ 03:11  Mean cell Vkqtnufjmu28.9  Mean cell Hemoglobin Conc33.2  Mean cell Volum 86.9  Platelet count-Automate 155  RBC Count 4.29  Red Cell Distrib Width12.7  WBC Count7.71  % Albumin, Urine--  Hematocrit 37.3  Hemoglobin 12.4  CBC  Date: 09-09-24 @ 19:55  Mean cell Kudcxeoufl18.1  Mean cell Hemoglobin Conc32.9  Mean cell Volum 88.3  Platelet count-Automate 187  RBC Count 4.71  Red Cell Distrib Width12.7  WBC Count8.46  % Albumin, Urine--  Hematocrit 41.6  Hemoglobin 13.7    BMP  09-11-24 @ 03:11  Blood Gas Arterial-Calcium,Ionized--  Blood Urea Nitrogen, Serum 26 mg/dL<H> [10 - 20]  Carbon Dioxide, Serum22 mmol/L [17 - 32]  Chloride, Serum96 mmol/L<L> [98 - 110]  Creatinie, Serum1.3 mg/dL [0.7 - 1.5]  Glucose, Habzb340 mg/dL<H> [70 - 99]  Potassium, Serum4.0 mmol/L [3.5 - 5.0]  Sodium, Serum 133 mmol/L<L> [135 - 146]  [facility]  09-09-24 @ 19:55  Blood Gas Arterial-Calcium,Ionized--  Blood Urea Nitrogen, Serum 35 mg/dL<H> [10 - 20]  Carbon Dioxide, Serum25 mmol/L [17 - 32]  Chloride, Ebbln355 mmol/L [98 - 110]  Creatinie, Serum1.2 mg/dL [0.7 - 1.5]  Glucose, Xkgbd520 mg/dL<H> [70 - 99]  Potassium, Serum4.2 mmol/L [3.5 - 5.0]  Sodium, Serum 139 mmol/L [135 - 146]              Microbiology:    Culture - Urine (collected 09-09-24 @ 19:55)  Source: Clean Catch Clean Catch (Midstream)  Final Report (09-12-24 @ 00:23):    <10,000 CFU/mL Normal Urogenital Lili        RADIOLOGY & ADDITIONAL TESTS:        Medications:  acetaminophen     Tablet .. 650 milliGRAM(s) Oral every 6 hours PRN  aluminum hydroxide/magnesium hydroxide/simethicone Suspension 30 milliLiter(s) Oral every 4 hours PRN  amLODIPine   Tablet 5 milliGRAM(s) Oral daily  aspirin enteric coated 81 milliGRAM(s) Oral daily  cefTRIAXone   IVPB 1000 milliGRAM(s) IV Intermittent every 24 hours  heparin   Injectable 5000 Unit(s) SubCutaneous every 12 hours  lamoTRIgine 100 milliGRAM(s) Oral two times a day  latanoprost 0.005% Ophthalmic Solution 1 Drop(s) Both EYES at bedtime  levETIRAcetam 750 milliGRAM(s) Oral two times a day  levothyroxine 88 MICROGram(s) Oral daily  ondansetron Injectable 4 milliGRAM(s) IV Push every 8 hours PRN  PARoxetine 30 milliGRAM(s) Oral daily  QUEtiapine 50 milliGRAM(s) Oral at bedtime  rosuvastatin 20 milliGRAM(s) Oral at bedtime  sodium chloride 0.9%. 1000 milliLiter(s) IV Continuous <Continuous>        Assessment and Plan:  83 year old female past medical history of dementia, seizure, HTN, HLD, hypothyroidism comes to emergency room for AMS. patient in emergency room 2 days ago diagnosed with UTI and sent home with levaquin. confused over past 4 days with hematuria, had CT head for confusion and CTAP  that showed no acute pathology .  brought in again by family for continued  symptoms      # Acute metabolic encephalopathy  Patient alert and oriented to self and place at baseline, able to recognize her daughter and able to perform basic ADls with minimal support. Since Monday, she has been feeling more weak, unable to perform ADls and more disoriented, particularly to place. Typically not oriented in time. No clear neurologic deficits on exam otherwise. Suspect the etiology is metabolic, likely related to probable UTI. Blood cultures have also been drawn to investigate for bacteremia. Her imaging exams have not revealed any acute intracranial or intrabdominal pathology and she does not have any localizing signs or symptoms of a respiratory infection. Of note, slightly elevated LFTs which are now downtrending might indicate a recovering viral syndrome or may be related to antibiotic use.  Today patient appears much improved in terms of mentation and more able to answer questions. Will continue current management.  - D/benjamin Levaquin on 9/11, continue Ceftriaxone (9/12)  - ID consult, appreciate recs  - Easy to chew diet  - UCx unremarkable however patient was on antibiotics at that time. BCx pending  - Follow TSH, B12 and folate levels to investigate other causes of encephalopathy      # Seizures  - c/w levetiracetam and lamotrigine (home meds)  - seizure precautions     #Hepatocellular injury  -Mild and downtrending, suspect related to antibiotics vs. resolving viral syndrome  -CTM LFTs daily    # HTN  # HLD  - c/w amlodipine   - c/w statin  - monitor BP  - DASH diet    # Hypothyroidism  - c/w synthroid     Diet:  VTE PPx: heparin TID (Cre elevated for age suspect GFR lower than calculated)  PT/OT/Dispo:PT consulted, will follow recs  Lines: PIV  Hurtado:No hurtado in place  Surrogate: Daughter at the bedside

## 2024-09-13 LAB
ALBUMIN SERPL ELPH-MCNC: 3.6 G/DL — SIGNIFICANT CHANGE UP (ref 3.5–5.2)
ALP SERPL-CCNC: 88 U/L — SIGNIFICANT CHANGE UP (ref 30–115)
ALT FLD-CCNC: 45 U/L — HIGH (ref 0–41)
ANION GAP SERPL CALC-SCNC: 11 MMOL/L — SIGNIFICANT CHANGE UP (ref 7–14)
AST SERPL-CCNC: 50 U/L — HIGH (ref 0–41)
BILIRUB SERPL-MCNC: 0.3 MG/DL — SIGNIFICANT CHANGE UP (ref 0.2–1.2)
BUN SERPL-MCNC: 14 MG/DL — SIGNIFICANT CHANGE UP (ref 10–20)
CALCIUM SERPL-MCNC: 8.6 MG/DL — SIGNIFICANT CHANGE UP (ref 8.4–10.5)
CHLORIDE SERPL-SCNC: 103 MMOL/L — SIGNIFICANT CHANGE UP (ref 98–110)
CO2 SERPL-SCNC: 27 MMOL/L — SIGNIFICANT CHANGE UP (ref 17–32)
CREAT SERPL-MCNC: 1 MG/DL — SIGNIFICANT CHANGE UP (ref 0.7–1.5)
EGFR: 56 ML/MIN/1.73M2 — LOW
FOLATE SERPL-MCNC: 10.3 NG/ML — SIGNIFICANT CHANGE UP
GLUCOSE SERPL-MCNC: 131 MG/DL — HIGH (ref 70–99)
HCT VFR BLD CALC: 36.3 % — LOW (ref 37–47)
HGB BLD-MCNC: 12.1 G/DL — SIGNIFICANT CHANGE UP (ref 12–16)
MCHC RBC-ENTMCNC: 29.2 PG — SIGNIFICANT CHANGE UP (ref 27–31)
MCHC RBC-ENTMCNC: 33.3 G/DL — SIGNIFICANT CHANGE UP (ref 32–37)
MCV RBC AUTO: 87.5 FL — SIGNIFICANT CHANGE UP (ref 81–99)
NRBC # BLD: 0 /100 WBCS — SIGNIFICANT CHANGE UP (ref 0–0)
PLATELET # BLD AUTO: 159 K/UL — SIGNIFICANT CHANGE UP (ref 130–400)
PMV BLD: 10 FL — SIGNIFICANT CHANGE UP (ref 7.4–10.4)
POTASSIUM SERPL-MCNC: 4.3 MMOL/L — SIGNIFICANT CHANGE UP (ref 3.5–5)
POTASSIUM SERPL-SCNC: 4.3 MMOL/L — SIGNIFICANT CHANGE UP (ref 3.5–5)
PROT SERPL-MCNC: 6.3 G/DL — SIGNIFICANT CHANGE UP (ref 6–8)
RBC # BLD: 4.15 M/UL — LOW (ref 4.2–5.4)
RBC # FLD: 12.9 % — SIGNIFICANT CHANGE UP (ref 11.5–14.5)
SODIUM SERPL-SCNC: 141 MMOL/L — SIGNIFICANT CHANGE UP (ref 135–146)
VIT B12 SERPL-MCNC: 919 PG/ML — SIGNIFICANT CHANGE UP (ref 232–1245)
WBC # BLD: 4.99 K/UL — SIGNIFICANT CHANGE UP (ref 4.8–10.8)
WBC # FLD AUTO: 4.99 K/UL — SIGNIFICANT CHANGE UP (ref 4.8–10.8)

## 2024-09-13 PROCEDURE — 99232 SBSQ HOSP IP/OBS MODERATE 35: CPT

## 2024-09-13 RX ADMIN — Medication 50 MILLIGRAM(S): at 22:01

## 2024-09-13 RX ADMIN — Medication 5000 UNIT(S): at 05:59

## 2024-09-13 RX ADMIN — Medication 100 MILLIGRAM(S): at 17:21

## 2024-09-13 RX ADMIN — Medication 88 MICROGRAM(S): at 05:59

## 2024-09-13 RX ADMIN — LAMOTRIGINE 100 MILLIGRAM(S): 100 TABLET, EXTENDED RELEASE ORAL at 05:59

## 2024-09-13 RX ADMIN — ROSUVASTATIN CALCIUM 20 MILLIGRAM(S): 10 TABLET ORAL at 22:01

## 2024-09-13 RX ADMIN — LEVETIRACETAM 750 MILLIGRAM(S): 1000 TABLET ORAL at 17:17

## 2024-09-13 RX ADMIN — Medication 81 MILLIGRAM(S): at 12:41

## 2024-09-13 RX ADMIN — Medication 5000 UNIT(S): at 17:17

## 2024-09-13 RX ADMIN — AMLODIPINE BESYLATE 5 MILLIGRAM(S): 10 TABLET ORAL at 05:59

## 2024-09-13 RX ADMIN — PAROXETINE 30 MILLIGRAM(S): 10 TABLET, FILM COATED ORAL at 12:41

## 2024-09-13 RX ADMIN — LATANOPROST 1 DROP(S): 50 SOLUTION OPHTHALMIC at 22:01

## 2024-09-13 RX ADMIN — LEVETIRACETAM 750 MILLIGRAM(S): 1000 TABLET ORAL at 05:59

## 2024-09-13 RX ADMIN — LAMOTRIGINE 100 MILLIGRAM(S): 100 TABLET, EXTENDED RELEASE ORAL at 17:17

## 2024-09-13 NOTE — PROGRESS NOTE ADULT - SUBJECTIVE AND OBJECTIVE BOX
LYLE VANCE  [facility]-S 4I 006 A ([facility]-S 4I)            Patient was evaluated and examined  by bedside, ongoing clinical improvement                REVIEW OF SYSTEMS:  please see pertinent positives mentioned above, all other 12 ROS negative      T(C): , Max: 36.8 (09-12-24 @ 20:27)  HR: 76 (09-13-24 @ 13:41)  BP: 113/64 (09-13-24 @ 13:41)  RR: 18 (09-13-24 @ 13:41)  SpO2: 97% (09-13-24 @ 04:51)  CAPILLARY BLOOD GLUCOSE          PHYSICAL EXAM:  General: comfortable, AOx2 (place and name)  HEENT: AT, NC, Supple  Lungs: CTA B/L  CVS: RRR  Abdomen: soft, bowel sounds present, non-tender, non-distended  Extremities: no edema        LAB  CBC  Date: 09-13-24 @ 12:08  Mean cell Vzjeuvyapv36.2  Mean cell Hemoglobin Conc33.3  Mean cell Volum 87.5  Platelet count-Automate 159  RBC Count 4.15  Red Cell Distrib Width12.9  WBC Count4.99  % Albumin, Urine--  Hematocrit 36.3  Hemoglobin 12.1  CBC  Date: 09-12-24 @ 06:56  Mean cell Xfjcykfvin08.4  Mean cell Hemoglobin Conc33.6  Mean cell Volum 87.5  Platelet count-Automate 145  RBC Count 4.15  Red Cell Distrib Width13.0  WBC Count5.86  % Albumin, Urine--  Hematocrit 36.3  Hemoglobin 12.2  CBC  Date: 09-11-24 @ 12:34  Mean cell Yzyfosazfq41.4  Mean cell Hemoglobin Conc33.8  Mean cell Volum 87.0  Platelet count-Automate 156  RBC Count 4.08  Red Cell Distrib Width12.7  WBC Count6.96  % Albumin, Urine--  Hematocrit 35.5  Hemoglobin 12.0  CBC  Date: 09-11-24 @ 03:11  Mean cell Dgxdxckses17.9  Mean cell Hemoglobin Conc33.2  Mean cell Volum 86.9  Platelet count-Automate 155  RBC Count 4.29  Red Cell Distrib Width12.7  WBC Count7.71  % Albumin, Urine--  Hematocrit 37.3  Hemoglobin 12.4  CBC  Date: 09-09-24 @ 19:55  Mean cell Sjiqelldkf76.1  Mean cell Hemoglobin Conc32.9  Mean cell Volum 88.3  Platelet count-Automate 187  RBC Count 4.71  Red Cell Distrib Width12.7  WBC Count8.46  % Albumin, Urine--  Hematocrit 41.6  Hemoglobin 13.7    [facility]  09-13-24 @ 12:08  Blood Gas Arterial-Calcium,Ionized--  Blood Urea Nitrogen, Serum 14 mg/dL [10 - 20]  Carbon Dioxide, Serum27 mmol/L [17 - 32]  Chloride, Qbset757 mmol/L [98 - 110]  Creatinie, Serum1.0 mg/dL [0.7 - 1.5]  Glucose, Jdfda543 mg/dL<H> [70 - 99]  Potassium, Serum4.3 mmol/L [3.5 - 5.0]  Sodium, Serum 141 mmol/L [135 - 146]  [facility]  09-12-24 @ 06:56  Blood Gas Arterial-Calcium,Ionized--  Blood Urea Nitrogen, Serum 17 mg/dL [10 - 20]  Carbon Dioxide, Serum26 mmol/L [17 - 32]  Chloride, Llgif060 mmol/L [98 - 110]  Creatinie, Serum1.0 mg/dL [0.7 - 1.5]  Glucose, Serum90 mg/dL [70 - 99]  Potassium, Serum4.1 mmol/L [3.5 - 5.0]  Sodium, Serum 141 mmol/L [135 - 146]  [facility]  09-11-24 @ 03:11  Blood Gas Arterial-Calcium,Ionized--  Blood Urea Nitrogen, Serum 26 mg/dL<H> [10 - 20]  Carbon Dioxide, Serum22 mmol/L [17 - 32]  Chloride, Serum96 mmol/L<L> [98 - 110]  Creatinie, Serum1.3 mg/dL [0.7 - 1.5]  Glucose, Pawlr700 mg/dL<H> [70 - 99]  Potassium, Serum4.0 mmol/L [3.5 - 5.0]  Sodium, Serum 133 mmol/L<L> [135 - 146]  [facility]  09-09-24 @ 19:55  Blood Gas Arterial-Calcium,Ionized--  Blood Urea Nitrogen, Serum 35 mg/dL<H> [10 - 20]  Carbon Dioxide, Serum25 mmol/L [17 - 32]  Chloride, Wigda889 mmol/L [98 - 110]  Creatinie, Serum1.2 mg/dL [0.7 - 1.5]  Glucose, Olbkd385 mg/dL<H> [70 - 99]  Potassium, Serum4.2 mmol/L [3.5 - 5.0]  Sodium, Serum 139 mmol/L [135 - 146]              Microbiology:    Culture - Urine (collected 09-11-24 @ 11:14)  Source: Clean Catch Clean Catch (Midstream)  Final Report (09-12-24 @ 23:16):    No growth    Culture - Blood (collected 09-11-24 @ 03:11)  Source: .Blood Blood  Preliminary Report (09-13-24 @ 17:01):    No growth at 48 Hours    Culture - Blood (collected 09-11-24 @ 03:11)  Source: .Blood Blood  Preliminary Report (09-13-24 @ 17:01):    No growth at 48 Hours    Culture - Urine (collected 09-09-24 @ 19:55)  Source: Clean Catch Clean Catch (Midstream)  Final Report (09-12-24 @ 00:23):    <10,000 CFU/mL Normal Urogenital Lili        RADIOLOGY & ADDITIONAL TESTS:        Medications:  acetaminophen     Tablet .. 650 milliGRAM(s) Oral every 6 hours PRN  aluminum hydroxide/magnesium hydroxide/simethicone Suspension 30 milliLiter(s) Oral every 4 hours PRN  amLODIPine   Tablet 5 milliGRAM(s) Oral daily  aspirin enteric coated 81 milliGRAM(s) Oral daily  heparin   Injectable 5000 Unit(s) SubCutaneous every 12 hours  lamoTRIgine 100 milliGRAM(s) Oral two times a day  latanoprost 0.005% Ophthalmic Solution 1 Drop(s) Both EYES at bedtime  levETIRAcetam 750 milliGRAM(s) Oral two times a day  levothyroxine 88 MICROGram(s) Oral daily  ondansetron Injectable 4 milliGRAM(s) IV Push every 8 hours PRN  PARoxetine 30 milliGRAM(s) Oral daily  QUEtiapine 50 milliGRAM(s) Oral at bedtime  rosuvastatin 20 milliGRAM(s) Oral at bedtime  sodium chloride 0.9%. 1000 milliLiter(s) IV Continuous <Continuous>        Assessment and Plan:  83 year old female past medical history of dementia, seizure, HTN, HLD, hypothyroidism comes to emergency room for AMS. patient in emergency room 2 days ago diagnosed with UTI and sent home with levaquin. confused over past 4 days with hematuria, had CT head for confusion and CTAP  that showed no acute pathology .  brought in again by family for continued  symptoms      # Acute metabolic encephalopathy  Patient alert and oriented to self and place at baseline, able to recognize her daughter and able to perform basic ADls with minimal support. Since Monday, she has been feeling more weak, unable to perform ADls and more disoriented, particularly to place. Typically not oriented in time. No clear neurologic deficits on exam otherwise. Suspect the etiology is metabolic, likely related to probable UTI. Blood cultures have also been drawn to investigate for bacteremia. Her imaging exams have not revealed any acute intracranial or intrabdominal pathology and she does not have any localizing signs or symptoms of a respiratory infection. Of note, slightly elevated LFTs which are now downtrending might indicate a recovering viral syndrome or may be related to antibiotic use.  Today patient appears much improved in terms of mentation and more able to answer questions. Will continue current management.  - D/benjamin Levaquin on 9/11, continue Ceftriaxone (9/12) EOT 9/14. Remains afebrile and HDS with improvement in mental status  - ID consult, appreciate recs  - Easy to chew diet  - UCx unremarkable however patient was on antibiotics at that time. BCx negative      # Seizures  - c/w levetiracetam and lamotrigine (home meds)  - seizure precautions     #Hepatocellular injury  -Mild and downtrending, suspect related to antibiotics vs. resolving viral syndrome  -CTM LFTs daily    # HTN  # HLD  - c/w amlodipine   - c/w statin  - monitor BP  - DASH diet    # Hypothyroidism  - c/w synthroid

## 2024-09-14 PROCEDURE — 99232 SBSQ HOSP IP/OBS MODERATE 35: CPT

## 2024-09-14 RX ORDER — LAMOTRIGINE 100 MG/1
0 TABLET, EXTENDED RELEASE ORAL
Qty: 0 | Refills: 0 | DISCHARGE

## 2024-09-14 RX ORDER — LAMOTRIGINE 100 MG/1
1 TABLET, EXTENDED RELEASE ORAL
Qty: 0 | Refills: 0 | DISCHARGE
Start: 2024-09-14

## 2024-09-14 RX ORDER — LEVOTHYROXINE SODIUM 100 MCG
0 TABLET ORAL
Qty: 0 | Refills: 0 | DISCHARGE

## 2024-09-14 RX ORDER — LEVETIRACETAM 1000 MG/1
1 TABLET ORAL
Qty: 0 | Refills: 0 | DISCHARGE
Start: 2024-09-14

## 2024-09-14 RX ORDER — AMLODIPINE BESYLATE 10 MG/1
1 TABLET ORAL
Qty: 0 | Refills: 0 | DISCHARGE
Start: 2024-09-14

## 2024-09-14 RX ORDER — AMLODIPINE BESYLATE 10 MG/1
0 TABLET ORAL
Qty: 0 | Refills: 0 | DISCHARGE

## 2024-09-14 RX ORDER — LEVETIRACETAM 1000 MG/1
0 TABLET ORAL
Qty: 0 | Refills: 0 | DISCHARGE

## 2024-09-14 RX ORDER — LEVOTHYROXINE SODIUM 100 MCG
1 TABLET ORAL
Qty: 0 | Refills: 0 | DISCHARGE
Start: 2024-09-14

## 2024-09-14 RX ADMIN — LAMOTRIGINE 100 MILLIGRAM(S): 100 TABLET, EXTENDED RELEASE ORAL at 06:16

## 2024-09-14 RX ADMIN — Medication 81 MILLIGRAM(S): at 13:18

## 2024-09-14 RX ADMIN — ROSUVASTATIN CALCIUM 20 MILLIGRAM(S): 10 TABLET ORAL at 21:14

## 2024-09-14 RX ADMIN — LEVETIRACETAM 750 MILLIGRAM(S): 1000 TABLET ORAL at 06:15

## 2024-09-14 RX ADMIN — LAMOTRIGINE 100 MILLIGRAM(S): 100 TABLET, EXTENDED RELEASE ORAL at 18:22

## 2024-09-14 RX ADMIN — Medication 88 MICROGRAM(S): at 06:15

## 2024-09-14 RX ADMIN — AMLODIPINE BESYLATE 5 MILLIGRAM(S): 10 TABLET ORAL at 06:15

## 2024-09-14 RX ADMIN — LEVETIRACETAM 750 MILLIGRAM(S): 1000 TABLET ORAL at 18:22

## 2024-09-14 RX ADMIN — PAROXETINE 30 MILLIGRAM(S): 10 TABLET, FILM COATED ORAL at 13:18

## 2024-09-14 RX ADMIN — Medication 5000 UNIT(S): at 06:15

## 2024-09-14 RX ADMIN — Medication 50 MILLIGRAM(S): at 21:14

## 2024-09-14 RX ADMIN — Medication 5000 UNIT(S): at 18:21

## 2024-09-14 RX ADMIN — LATANOPROST 1 DROP(S): 50 SOLUTION OPHTHALMIC at 21:14

## 2024-09-14 NOTE — SWALLOW BEDSIDE ASSESSMENT ADULT - SLP GENERAL OBSERVATIONS
Pt. received OOB awake. Family at b/s. Baseline confusion. Per family confusion is increased compared to baseline mental status.
Pt found Sitting in chair, awake and alert w/  next to her. Pt and  deny any dysphagia.

## 2024-09-14 NOTE — SWALLOW BEDSIDE ASSESSMENT ADULT - CONSISTENCIES ADMINISTERED
thin liquid/pureed/easy to chew
MIN trials accepted before pt. said she didn't want anymore/thin liquid/pureed/easy to chew

## 2024-09-14 NOTE — SWALLOW BEDSIDE ASSESSMENT ADULT - SWALLOW EVAL: DIAGNOSIS
+toleration of ETC w/ thin liquids w/o overt s/s of aspiration/penetration
Toleration of min trials of puree, easy to chew, with thin liquids w/o overt s/s of penetration/aspiration.

## 2024-09-14 NOTE — SWALLOW BEDSIDE ASSESSMENT ADULT - SLP PERTINENT HISTORY OF CURRENT PROBLEM
83 year old female past medical history of dementia, seizure, seizure, HTN, HLD, hypothyroidism comes to emergency room for AMS. patient in emergency room 2 days ago diagnosed with UTI and sent home with levaquin. confused over past 4 days with hematuria, had CT head for confusion and CTAP  that showed no acute pathology .  brought in again by family for continued  symptoms  and also general weakness,  unable to walk without assistance,  still confused no falls.  no fever.
83 year old female past medical history of dementia, seizure, seizure, HTN, HLD, hypothyroidism comes to emergency room for AMS. patient in emergency room 2 days ago diagnosed with UTI and sent home with levaquin. confused over past 4 days with hematuria, had CT head for confusion and CTAP  that showed no acute pathology .  brought in again by family for continued  symptoms  and also general weakness,  unable to walk without assistance,  still confused no falls.  no fever.

## 2024-09-14 NOTE — PROGRESS NOTE ADULT - SUBJECTIVE AND OBJECTIVE BOX
LYLE VANCE  [facility]            Patient was evaluated and examined  by bedside, stable for discharge today                REVIEW OF SYSTEMS:  please see pertinent positives mentioned above, all other 12 ROS negative      T(C): , Max: 36.7 (09-13-24 @ 20:15)  HR: 87 (09-14-24 @ 04:46)  BP: 146/75 (09-14-24 @ 04:46)  RR: 18 (09-14-24 @ 04:46)  SpO2: 94% (09-14-24 @ 04:46)  CAPILLARY BLOOD GLUCOSE          PHYSICAL EXAM:  General: comfortable, AOx2 (place and name)  HEENT: AT, NC, Supple  Lungs: CTA B/L  CVS: RRR  Abdomen: soft, bowel sounds present, non-tender, non-distended  Extremities: no edema      LAB  CBC  Date: 09-13-24 @ 12:08  Mean cell Mcvizhlako29.2  Mean cell Hemoglobin Conc33.3  Mean cell Volum 87.5  Platelet count-Automate 159  RBC Count 4.15  Red Cell Distrib Width12.9  WBC Count4.99  % Albumin, Urine--  Hematocrit 36.3  Hemoglobin 12.1  CBC  Date: 09-12-24 @ 06:56  Mean cell Cpmngszomr33.4  Mean cell Hemoglobin Conc33.6  Mean cell Volum 87.5  Platelet count-Automate 145  RBC Count 4.15  Red Cell Distrib Width13.0  WBC Count5.86  % Albumin, Urine--  Hematocrit 36.3  Hemoglobin 12.2  CBC  Date: 09-11-24 @ 12:34  Mean cell Hjmjsctceb95.4  Mean cell Hemoglobin Conc33.8  Mean cell Volum 87.0  Platelet count-Automate 156  RBC Count 4.08  Red Cell Distrib Width12.7  WBC Count6.96  % Albumin, Urine--  Hematocrit 35.5  Hemoglobin 12.0  CBC  Date: 09-11-24 @ 03:11  Mean cell Ngygzhccgy26.9  Mean cell Hemoglobin Conc33.2  Mean cell Volum 86.9  Platelet count-Automate 155  RBC Count 4.29  Red Cell Distrib Width12.7  WBC Count7.71  % Albumin, Urine--  Hematocrit 37.3  Hemoglobin 12.4  CBC  Date: 09-09-24 @ 19:55  Mean cell Ajnibpyfyk99.1  Mean cell Hemoglobin Conc32.9  Mean cell Volum 88.3  Platelet count-Automate 187  RBC Count 4.71  Red Cell Distrib Width12.7  WBC Count8.46  % Albumin, Urine--  Hematocrit 41.6  Hemoglobin 13.7    [facility]  09-13-24 @ 12:08  Blood Gas Arterial-Calcium,Ionized--  Blood Urea Nitrogen, Serum 14 mg/dL [10 - 20]  Carbon Dioxide, Serum27 mmol/L [17 - 32]  Chloride, Iiqye356 mmol/L [98 - 110]  Creatinie, Serum1.0 mg/dL [0.7 - 1.5]  Glucose, Zlmye226 mg/dL<H> [70 - 99]  Potassium, Serum4.3 mmol/L [3.5 - 5.0]  Sodium, Serum 141 mmol/L [135 - 146]  [facility]  09-12-24 @ 06:56  Blood Gas Arterial-Calcium,Ionized--  Blood Urea Nitrogen, Serum 17 mg/dL [10 - 20]  Carbon Dioxide, Serum26 mmol/L [17 - 32]  Chloride, Patut162 mmol/L [98 - 110]  Creatinie, Serum1.0 mg/dL [0.7 - 1.5]  Glucose, Serum90 mg/dL [70 - 99]  Potassium, Serum4.1 mmol/L [3.5 - 5.0]  Sodium, Serum 141 mmol/L [135 - 146]  [facility]  09-11-24 @ 03:11  Blood Gas Arterial-Calcium,Ionized--  Blood Urea Nitrogen, Serum 26 mg/dL<H> [10 - 20]  Carbon Dioxide, Serum22 mmol/L [17 - 32]  Chloride, Serum96 mmol/L<L> [98 - 110]  Creatinie, Serum1.3 mg/dL [0.7 - 1.5]  Glucose, Yvkdv714 mg/dL<H> [70 - 99]  Potassium, Serum4.0 mmol/L [3.5 - 5.0]  Sodium, Serum 133 mmol/L<L> [135 - 146]  [facility]  09-09-24 @ 19:55  Blood Gas Arterial-Calcium,Ionized--  Blood Urea Nitrogen, Serum 35 mg/dL<H> [10 - 20]  Carbon Dioxide, Serum25 mmol/L [17 - 32]  Chloride, Ggqom362 mmol/L [98 - 110]  Creatinie, Serum1.2 mg/dL [0.7 - 1.5]  Glucose, Rftqm493 mg/dL<H> [70 - 99]  Potassium, Serum4.2 mmol/L [3.5 - 5.0]  Sodium, Serum 139 mmol/L [135 - 146]              Microbiology:    Culture - Urine (collected 09-11-24 @ 11:14)  Source: Clean Catch Clean Catch (Midstream)  Final Report (09-12-24 @ 23:16):    No growth    Culture - Blood (collected 09-11-24 @ 03:11)  Source: .Blood Blood  Preliminary Report (09-13-24 @ 17:01):    No growth at 48 Hours    Culture - Blood (collected 09-11-24 @ 03:11)  Source: .Blood Blood  Preliminary Report (09-13-24 @ 17:01):    No growth at 48 Hours    Culture - Urine (collected 09-09-24 @ 19:55)  Source: Clean Catch Clean Catch (Midstream)  Final Report (09-12-24 @ 00:23):    <10,000 CFU/mL Normal Urogenital Lili        RADIOLOGY & ADDITIONAL TESTS:        Medications:  acetaminophen     Tablet .. 650 milliGRAM(s) Oral every 6 hours PRN  aluminum hydroxide/magnesium hydroxide/simethicone Suspension 30 milliLiter(s) Oral every 4 hours PRN  amLODIPine   Tablet 5 milliGRAM(s) Oral daily  aspirin enteric coated 81 milliGRAM(s) Oral daily  heparin   Injectable 5000 Unit(s) SubCutaneous every 12 hours  lamoTRIgine 100 milliGRAM(s) Oral two times a day  latanoprost 0.005% Ophthalmic Solution 1 Drop(s) Both EYES at bedtime  levETIRAcetam 750 milliGRAM(s) Oral two times a day  levothyroxine 88 MICROGram(s) Oral daily  ondansetron Injectable 4 milliGRAM(s) IV Push every 8 hours PRN  PARoxetine 30 milliGRAM(s) Oral daily  QUEtiapine 50 milliGRAM(s) Oral at bedtime  rosuvastatin 20 milliGRAM(s) Oral at bedtime  sodium chloride 0.9%. 1000 milliLiter(s) IV Continuous <Continuous>        Assessment and Plan:  83 year old female past medical history of dementia, seizure, HTN, HLD, hypothyroidism comes to emergency room for AMS. patient in emergency room 2 days ago diagnosed with UTI and sent home with levaquin. confused over past 4 days with hematuria, had CT head for confusion and CTAP  that showed no acute pathology .  brought in again by family for continued  symptoms      # Acute metabolic encephalopathy  #UTI  Resolved, likely due to UTI  - D/benjamin Levaquin on 9/11, completed 3 days of IV ceftriaxone (9/11-9/13) EOT 9/14. Remains afebrile and HDS  with improvement in mental status. Plan to discharge today after completing last dose of ceftriaxone as per ID note.  - ID consult, appreciate recs  - Easy to chew diet  - UCx unremarkable however patient was on antibiotics at that time. BCx negative      # Seizures  - c/w levetiracetam and lamotrigine (home meds)  - seizure precautions     #Hepatocellular injury  -Mild and downtrending, suspect related to antibiotics vs. resolving viral syndrome    # HTN  # HLD  - c/w amlodipine   - c/w statin  - monitor BP  - DASH diet    # Hypothyroidism  - c/w synthroid        LYLE VANCE  [facility] 4I 006 A (SI-S 4I)            Patient was evaluated and examined  by bedside, stable for discharge. CM working with daughter for SNF placement                REVIEW OF SYSTEMS:  please see pertinent positives mentioned above, all other 12 ROS negative      T(C): , Max: 36.7 (09-13-24 @ 20:15)  HR: 87 (09-14-24 @ 04:46)  BP: 146/75 (09-14-24 @ 04:46)  RR: 18 (09-14-24 @ 04:46)  SpO2: 94% (09-14-24 @ 04:46)  CAPILLARY BLOOD GLUCOSE          PHYSICAL EXAM:  General: comfortable, AOx2 (place and name)  HEENT: AT, NC, Supple  Lungs: CTA B/L  CVS: RRR  Abdomen: soft, bowel sounds present, non-tender, non-distended  Extremities: no edema      LAB  CBC  Date: 09-13-24 @ 12:08  Mean cell Daaiboroyk11.2  Mean cell Hemoglobin Conc33.3  Mean cell Volum 87.5  Platelet count-Automate 159  RBC Count 4.15  Red Cell Distrib Width12.9  WBC Count4.99  % Albumin, Urine--  Hematocrit 36.3  Hemoglobin 12.1  CBC  Date: 09-12-24 @ 06:56  Mean cell Hrhxwkafyr37.4  Mean cell Hemoglobin Conc33.6  Mean cell Volum 87.5  Platelet count-Automate 145  RBC Count 4.15  Red Cell Distrib Width13.0  WBC Count5.86  % Albumin, Urine--  Hematocrit 36.3  Hemoglobin 12.2  CBC  Date: 09-11-24 @ 12:34  Mean cell Tdxxgmrsgd01.4  Mean cell Hemoglobin Conc33.8  Mean cell Volum 87.0  Platelet count-Automate 156  RBC Count 4.08  Red Cell Distrib Width12.7  WBC Count6.96  % Albumin, Urine--  Hematocrit 35.5  Hemoglobin 12.0  CBC  Date: 09-11-24 @ 03:11  Mean cell Oaiwxdjfxm00.9  Mean cell Hemoglobin Conc33.2  Mean cell Volum 86.9  Platelet count-Automate 155  RBC Count 4.29  Red Cell Distrib Width12.7  WBC Count7.71  % Albumin, Urine--  Hematocrit 37.3  Hemoglobin 12.4  CBC  Date: 09-09-24 @ 19:55  Mean cell Pusnpwilbe31.1  Mean cell Hemoglobin Conc32.9  Mean cell Volum 88.3  Platelet count-Automate 187  RBC Count 4.71  Red Cell Distrib Width12.7  WBC Count8.46  % Albumin, Urine--  Hematocrit 41.6  Hemoglobin 13.7    [facility]  09-13-24 @ 12:08  Blood Gas Arterial-Calcium,Ionized--  Blood Urea Nitrogen, Serum 14 mg/dL [10 - 20]  Carbon Dioxide, Serum27 mmol/L [17 - 32]  Chloride, Zsfhb919 mmol/L [98 - 110]  Creatinie, Serum1.0 mg/dL [0.7 - 1.5]  Glucose, Djtok749 mg/dL<H> [70 - 99]  Potassium, Serum4.3 mmol/L [3.5 - 5.0]  Sodium, Serum 141 mmol/L [135 - 146]  [facility]  09-12-24 @ 06:56  Blood Gas Arterial-Calcium,Ionized--  Blood Urea Nitrogen, Serum 17 mg/dL [10 - 20]  Carbon Dioxide, Serum26 mmol/L [17 - 32]  Chloride, Hlygv087 mmol/L [98 - 110]  Creatinie, Serum1.0 mg/dL [0.7 - 1.5]  Glucose, Serum90 mg/dL [70 - 99]  Potassium, Serum4.1 mmol/L [3.5 - 5.0]  Sodium, Serum 141 mmol/L [135 - 146]  [facility]  09-11-24 @ 03:11  Blood Gas Arterial-Calcium,Ionized--  Blood Urea Nitrogen, Serum 26 mg/dL<H> [10 - 20]  Carbon Dioxide, Serum22 mmol/L [17 - 32]  Chloride, Serum96 mmol/L<L> [98 - 110]  Creatinie, Serum1.3 mg/dL [0.7 - 1.5]  Glucose, Loqgm902 mg/dL<H> [70 - 99]  Potassium, Serum4.0 mmol/L [3.5 - 5.0]  Sodium, Serum 133 mmol/L<L> [135 - 146]  [facility]  09-09-24 @ 19:55  Blood Gas Arterial-Calcium,Ionized--  Blood Urea Nitrogen, Serum 35 mg/dL<H> [10 - 20]  Carbon Dioxide, Serum25 mmol/L [17 - 32]  Chloride, Crbcx107 mmol/L [98 - 110]  Creatinie, Serum1.2 mg/dL [0.7 - 1.5]  Glucose, Smcpv868 mg/dL<H> [70 - 99]  Potassium, Serum4.2 mmol/L [3.5 - 5.0]  Sodium, Serum 139 mmol/L [135 - 146]              Microbiology:    Culture - Urine (collected 09-11-24 @ 11:14)  Source: Clean Catch Clean Catch (Midstream)  Final Report (09-12-24 @ 23:16):    No growth    Culture - Blood (collected 09-11-24 @ 03:11)  Source: .Blood Blood  Preliminary Report (09-13-24 @ 17:01):    No growth at 48 Hours    Culture - Blood (collected 09-11-24 @ 03:11)  Source: .Blood Blood  Preliminary Report (09-13-24 @ 17:01):    No growth at 48 Hours    Culture - Urine (collected 09-09-24 @ 19:55)  Source: Clean Catch Clean Catch (Midstream)  Final Report (09-12-24 @ 00:23):    <10,000 CFU/mL Normal Urogenital Lili        RADIOLOGY & ADDITIONAL TESTS:        Medications:  acetaminophen     Tablet .. 650 milliGRAM(s) Oral every 6 hours PRN  aluminum hydroxide/magnesium hydroxide/simethicone Suspension 30 milliLiter(s) Oral every 4 hours PRN  amLODIPine   Tablet 5 milliGRAM(s) Oral daily  aspirin enteric coated 81 milliGRAM(s) Oral daily  heparin   Injectable 5000 Unit(s) SubCutaneous every 12 hours  lamoTRIgine 100 milliGRAM(s) Oral two times a day  latanoprost 0.005% Ophthalmic Solution 1 Drop(s) Both EYES at bedtime  levETIRAcetam 750 milliGRAM(s) Oral two times a day  levothyroxine 88 MICROGram(s) Oral daily  ondansetron Injectable 4 milliGRAM(s) IV Push every 8 hours PRN  PARoxetine 30 milliGRAM(s) Oral daily  QUEtiapine 50 milliGRAM(s) Oral at bedtime  rosuvastatin 20 milliGRAM(s) Oral at bedtime  sodium chloride 0.9%. 1000 milliLiter(s) IV Continuous <Continuous>        Assessment and Plan:  83 year old female past medical history of dementia, seizure, HTN, HLD, hypothyroidism comes to emergency room for AMS. patient in emergency room 2 days ago diagnosed with UTI and sent home with levaquin. confused over past 4 days with hematuria, had CT head for confusion and CTAP  that showed no acute pathology .  brought in again by family for continued  symptoms      # Acute metabolic encephalopathy  #UTI  Resolved, likely due to UTI  - D/benjamin Levaquin on 9/11, completed 3 days of IV ceftriaxone (9/11-9/13). Remains afebrile and HDS  with improvement in mental status. Plan to discharge to SNF.  - ID consult, appreciate recs  - Easy to chew diet  - UCx unremarkable however patient was on antibiotics at that time. BCx negative      # Seizures  - c/w levetiracetam and lamotrigine (home meds)  - seizure precautions     #Hepatocellular injury  -Mild and downtrending, suspect related to antibiotics vs. resolving viral syndrome    # HTN  # HLD  - c/w amlodipine   - c/w statin  - monitor BP  - DASH diet    # Hypothyroidism  - c/w synthroid

## 2024-09-15 PROCEDURE — 99232 SBSQ HOSP IP/OBS MODERATE 35: CPT

## 2024-09-15 RX ADMIN — ROSUVASTATIN CALCIUM 20 MILLIGRAM(S): 10 TABLET ORAL at 21:05

## 2024-09-15 RX ADMIN — AMLODIPINE BESYLATE 5 MILLIGRAM(S): 10 TABLET ORAL at 05:45

## 2024-09-15 RX ADMIN — Medication 5000 UNIT(S): at 05:45

## 2024-09-15 RX ADMIN — Medication 88 MICROGRAM(S): at 05:45

## 2024-09-15 RX ADMIN — LEVETIRACETAM 750 MILLIGRAM(S): 1000 TABLET ORAL at 18:00

## 2024-09-15 RX ADMIN — LATANOPROST 1 DROP(S): 50 SOLUTION OPHTHALMIC at 21:06

## 2024-09-15 RX ADMIN — LAMOTRIGINE 100 MILLIGRAM(S): 100 TABLET, EXTENDED RELEASE ORAL at 18:00

## 2024-09-15 RX ADMIN — Medication 50 MILLIGRAM(S): at 21:06

## 2024-09-15 RX ADMIN — LAMOTRIGINE 100 MILLIGRAM(S): 100 TABLET, EXTENDED RELEASE ORAL at 05:45

## 2024-09-15 RX ADMIN — PAROXETINE 30 MILLIGRAM(S): 10 TABLET, FILM COATED ORAL at 11:20

## 2024-09-15 RX ADMIN — LEVETIRACETAM 750 MILLIGRAM(S): 1000 TABLET ORAL at 05:45

## 2024-09-15 RX ADMIN — Medication 81 MILLIGRAM(S): at 11:20

## 2024-09-15 RX ADMIN — Medication 5000 UNIT(S): at 18:00

## 2024-09-15 NOTE — DISCHARGE NOTE PROVIDER - NSDCCPCAREPLAN_GEN_ALL_CORE_FT
PRINCIPAL DISCHARGE DIAGNOSIS  Diagnosis: Acute UTI  Assessment and Plan of Treatment: You completed a course of antibiotics in the hospital. Return to the hospital if you develop severe pain, vomiting, fever.      SECONDARY DISCHARGE DIAGNOSES  Diagnosis: Metabolic encephalopathy  Assessment and Plan of Treatment: Due to urine infection. Your symptoms of confusion have improved. Return to hospital if worsening mental status-lethargy/confusion.    Diagnosis: Gait disorder  Assessment and Plan of Treatment: Reahb therapy at facility.

## 2024-09-15 NOTE — PROGRESS NOTE ADULT - SUBJECTIVE AND OBJECTIVE BOX
LYLE VANCE  [facility]            Patient was evaluated and examined  by bedside,                 REVIEW OF SYSTEMS:  please see pertinent positives mentioned above, all other 12 ROS negative      T(C): , Max: 36.9 (09-15-24 @ 05:10)  HR: 80 (09-15-24 @ 05:10)  BP: 124/62 (09-15-24 @ 05:10)  RR: 17 (09-15-24 @ 05:10)  SpO2: 95% (09-15-24 @ 05:10)  CAPILLARY BLOOD GLUCOSE          PHYSICAL EXAM:  General: comfortable, AOx2 (place and name)  HEENT: AT, NC, Supple  Lungs: CTA B/L  CVS: RRR  Abdomen: soft, bowel sounds present, non-tender, non-distended  Extremities: no edema    LAB  CBC  Date: 09-13-24 @ 12:08  Mean cell Libixztkim70.2  Mean cell Hemoglobin Conc33.3  Mean cell Volum 87.5  Platelet count-Automate 159  RBC Count 4.15  Red Cell Distrib Width12.9  WBC Count4.99  % Albumin, Urine--  Hematocrit 36.3  Hemoglobin 12.1  CBC  Date: 09-12-24 @ 06:56  Mean cell Vzrefsauyu27.4  Mean cell Hemoglobin Conc33.6  Mean cell Volum 87.5  Platelet count-Automate 145  RBC Count 4.15  Red Cell Distrib Width13.0  WBC Count5.86  % Albumin, Urine--  Hematocrit 36.3  Hemoglobin 12.2  CBC  Date: 09-11-24 @ 12:34  Mean cell Taphhpieor10.4  Mean cell Hemoglobin Conc33.8  Mean cell Volum 87.0  Platelet count-Automate 156  RBC Count 4.08  Red Cell Distrib Width12.7  WBC Count6.96  % Albumin, Urine--  Hematocrit 35.5  Hemoglobin 12.0  CBC  Date: 09-11-24 @ 03:11  Mean cell Fxndembqxb30.9  Mean cell Hemoglobin Conc33.2  Mean cell Volum 86.9  Platelet count-Automate 155  RBC Count 4.29  Red Cell Distrib Width12.7  WBC Count7.71  % Albumin, Urine--  Hematocrit 37.3  Hemoglobin 12.4  CBC  Date: 09-09-24 @ 19:55  Mean cell Tobxkwktso64.1  Mean cell Hemoglobin Conc32.9  Mean cell Volum 88.3  Platelet count-Automate 187  RBC Count 4.71  Red Cell Distrib Width12.7  WBC Count8.46  % Albumin, Urine--  Hematocrit 41.6  Hemoglobin 13.7    [facility]  09-13-24 @ 12:08  Blood Gas Arterial-Calcium,Ionized--  Blood Urea Nitrogen, Serum 14 mg/dL [10 - 20]  Carbon Dioxide, Serum27 mmol/L [17 - 32]  Chloride, Pnmvu620 mmol/L [98 - 110]  Creatinie, Serum1.0 mg/dL [0.7 - 1.5]  Glucose, Rnfdv797 mg/dL<H> [70 - 99]  Potassium, Serum4.3 mmol/L [3.5 - 5.0]  Sodium, Serum 141 mmol/L [135 - 146]  [facility]  09-12-24 @ 06:56  Blood Gas Arterial-Calcium,Ionized--  Blood Urea Nitrogen, Serum 17 mg/dL [10 - 20]  Carbon Dioxide, Serum26 mmol/L [17 - 32]  Chloride, Fbjxd869 mmol/L [98 - 110]  Creatinie, Serum1.0 mg/dL [0.7 - 1.5]  Glucose, Serum90 mg/dL [70 - 99]  Potassium, Serum4.1 mmol/L [3.5 - 5.0]  Sodium, Serum 141 mmol/L [135 - 146]  [facility]  09-11-24 @ 03:11  Blood Gas Arterial-Calcium,Ionized--  Blood Urea Nitrogen, Serum 26 mg/dL<H> [10 - 20]  Carbon Dioxide, Serum22 mmol/L [17 - 32]  Chloride, Serum96 mmol/L<L> [98 - 110]  Creatinie, Serum1.3 mg/dL [0.7 - 1.5]  Glucose, Ojgcg616 mg/dL<H> [70 - 99]  Potassium, Serum4.0 mmol/L [3.5 - 5.0]  Sodium, Serum 133 mmol/L<L> [135 - 146]              Microbiology:    Culture - Urine (collected 09-11-24 @ 11:14)  Source: Clean Catch Clean Catch (Midstream)  Final Report (09-12-24 @ 23:16):    No growth    Culture - Blood (collected 09-11-24 @ 03:11)  Source: .Blood Blood  Preliminary Report (09-14-24 @ 17:01):    No growth at 72 Hours    Culture - Blood (collected 09-11-24 @ 03:11)  Source: .Blood Blood  Preliminary Report (09-14-24 @ 17:01):    No growth at 72 Hours    Culture - Urine (collected 09-09-24 @ 19:55)  Source: Clean Catch Clean Catch (Midstream)  Final Report (09-12-24 @ 00:23):    <10,000 CFU/mL Normal Urogenital Lili        RADIOLOGY & ADDITIONAL TESTS:        Medications:  acetaminophen     Tablet .. 650 milliGRAM(s) Oral every 6 hours PRN  aluminum hydroxide/magnesium hydroxide/simethicone Suspension 30 milliLiter(s) Oral every 4 hours PRN  amLODIPine   Tablet 5 milliGRAM(s) Oral daily  aspirin enteric coated 81 milliGRAM(s) Oral daily  heparin   Injectable 5000 Unit(s) SubCutaneous every 12 hours  lamoTRIgine 100 milliGRAM(s) Oral two times a day  latanoprost 0.005% Ophthalmic Solution 1 Drop(s) Both EYES at bedtime  levETIRAcetam 750 milliGRAM(s) Oral two times a day  levothyroxine 88 MICROGram(s) Oral daily  ondansetron Injectable 4 milliGRAM(s) IV Push every 8 hours PRN  PARoxetine 30 milliGRAM(s) Oral daily  QUEtiapine 50 milliGRAM(s) Oral at bedtime  rosuvastatin 20 milliGRAM(s) Oral at bedtime  sodium chloride 0.9%. 1000 milliLiter(s) IV Continuous <Continuous>        Assessment and Plan:  83 year old female past medical history of dementia, seizure, HTN, HLD, hypothyroidism comes to emergency room for AMS. patient in emergency room 2 days ago diagnosed with UTI and sent home with levaquin. confused over past 4 days with hematuria, had CT head for confusion and CTAP  that showed no acute pathology .  brought in again by family for continued  symptoms      # Acute metabolic encephalopathy  #UTI  Resolved, likely due to UTI  - D/benjamin Levaquin on 9/11, completed 3 days of IV ceftriaxone (9/11-9/13). Remains afebrile and HDS  with improvement in mental status. Plan to discharge to SNF.  - ID consult, appreciate recs  - Easy to chew diet  - UCx unremarkable however patient was on antibiotics at that time. BCx negative      # Seizures  - c/w levetiracetam and lamotrigine (home meds)  - seizure precautions     #Hepatocellular injury  -Mild and downtrending, suspect related to antibiotics vs. resolving viral syndrome    # HTN  # HLD  - c/w amlodipine   - c/w statin  - monitor BP  - DASH diet    # Hypothyroidism  - c/w synthroid

## 2024-09-15 NOTE — DISCHARGE NOTE PROVIDER - CARE PROVIDER_API CALL
Jason Sue  Internal Medicine  [address] [place] [id number]  Phone: [phone]  Fax: [phone]  Follow Up Time: 1 week

## 2024-09-15 NOTE — DISCHARGE NOTE PROVIDER - HOSPITAL COURSE
83 year old female past medical history of dementia, seizure, HTN, HLD, hypothyroidism comes to emergency room for AMS. patient in emergency room 2 days ago diagnosed with UTI and sent home with levaquin. confused over past 4 days with hematuria, had CT head for confusion and CTAP  that showed no acute pathology .  brought in again by family for continued  symptoms      # Acute metabolic encephalopathy  #UTI  Resolved, likely due to UTI  - D/benjamin Levaquin on 9/11, completed 3 days of IV ceftriaxone (9/11-9/13). Remains afebrile and HDS  with improvement in mental status. Plan to discharge to SNF.  - ID consult, appreciate recs  - Easy to chew diet  - UCx unremarkable however patient was on antibiotics at that time. BCx negative      # Seizures  - c/w levetiracetam and lamotrigine (home meds)  - seizure precautions     #Hepatocellular injury  -Mild and downtrending, suspect related to antibiotics vs. resolving viral syndrome    # HTN  # HLD  - c/w amlodipine   - c/w statin  - monitor BP  - DASH diet    # Hypothyroidism  - c/w synthroid

## 2024-09-15 NOTE — DISCHARGE NOTE PROVIDER - NSDCMRMEDTOKEN_GEN_ALL_CORE_FT
amLODIPine 5 mg oral tablet: 1 tab(s) orally once a day  aspirin 81 mg oral tablet, chewable:   lamoTRIgine 100 mg oral tablet: 1 tab(s) orally 2 times a day  latanoprost 0.005% ophthalmic solution:   levETIRAcetam 750 mg oral tablet: 1 tab(s) orally 2 times a day  levothyroxine 88 mcg (0.088 mg) oral tablet: 1 tab(s) orally once a day  PAROXETINE HCL  40 MG TABS:   QUETIAPINE FUMARATE  50 MG TABS:   ROSUVASTATIN CALCIUM  20 MG TABS:   Vitamin B12:   Vitamin D3:

## 2024-09-16 ENCOUNTER — TRANSCRIPTION ENCOUNTER (OUTPATIENT)
Age: 84
End: 2024-09-16

## 2024-09-16 VITALS
RESPIRATION RATE: 16 BRPM | DIASTOLIC BLOOD PRESSURE: 57 MMHG | SYSTOLIC BLOOD PRESSURE: 96 MMHG | HEART RATE: 80 BPM | TEMPERATURE: 96 F

## 2024-09-16 LAB
CULTURE RESULTS: SIGNIFICANT CHANGE UP
CULTURE RESULTS: SIGNIFICANT CHANGE UP
SPECIMEN SOURCE: SIGNIFICANT CHANGE UP
SPECIMEN SOURCE: SIGNIFICANT CHANGE UP

## 2024-09-16 PROCEDURE — 99239 HOSP IP/OBS DSCHRG MGMT >30: CPT

## 2024-09-16 RX ADMIN — Medication 88 MICROGRAM(S): at 05:14

## 2024-09-16 RX ADMIN — LAMOTRIGINE 100 MILLIGRAM(S): 100 TABLET, EXTENDED RELEASE ORAL at 05:15

## 2024-09-16 RX ADMIN — Medication 5000 UNIT(S): at 05:14

## 2024-09-16 RX ADMIN — Medication 81 MILLIGRAM(S): at 11:16

## 2024-09-16 RX ADMIN — AMLODIPINE BESYLATE 5 MILLIGRAM(S): 10 TABLET ORAL at 05:15

## 2024-09-16 RX ADMIN — PAROXETINE 30 MILLIGRAM(S): 10 TABLET, FILM COATED ORAL at 11:16

## 2024-09-16 RX ADMIN — LEVETIRACETAM 750 MILLIGRAM(S): 1000 TABLET ORAL at 05:14

## 2024-09-16 NOTE — PROGRESS NOTE ADULT - SUBJECTIVE AND OBJECTIVE BOX
LYLE VANCE  [facility] ([facility]    Patient was evaluated and examined  by bedside, no acute events, anticipating dispo to SNF when bed available. Medically clear for d/c      REVIEW OF SYSTEMS:  please see pertinent positives mentioned above, all other 12 ROS negative      T(C): , Max: 37 (09-16-24 @ 05:02)  HR: 71 (09-16-24 @ 05:02)  BP: 130/67 (09-16-24 @ 05:02)  RR: 16 (09-15-24 @ 20:39)  SpO2: 98% (09-15-24 @ 20:39)  CAPILLARY BLOOD GLUCOSE          PHYSICAL EXAM:  General: NAD, AAOX3, patient is laying comfortably in bed  HEENT: AT, NC, Supple, NO JVD, NO CB  Lungs: CTA B/L, no wheezing, no rhonchi  CVS: normal S1, S2, RRR, NO M/G/R  Abdomen: soft, bowel sounds present, non-tender, non-distended  Extremities: no edema, no clubbing, no cyanosis, positive peripheral pulses b/l  Neuro: no acute focal neurological deficits  Skin: no rush, no ecchymosis      LAB  CBC  Date: 09-13-24 @ 12:08  Mean cell Vvsbeafgsb97.2  Mean cell Hemoglobin Conc33.3  Mean cell Volum 87.5  Platelet count-Automate 159  RBC Count 4.15  Red Cell Distrib Width12.9  WBC Count4.99  % Albumin, Urine--  Hematocrit 36.3  Hemoglobin 12.1  CBC  Date: 09-12-24 @ 06:56  Mean cell Angykickao56.4  Mean cell Hemoglobin Conc33.6  Mean cell Volum 87.5  Platelet count-Automate 145  RBC Count 4.15  Red Cell Distrib Width13.0  WBC Count5.86  % Albumin, Urine--  Hematocrit 36.3  Hemoglobin 12.2  CBC  Date: 09-11-24 @ 12:34  Mean cell Nlzhcwenku42.4  Mean cell Hemoglobin Conc33.8  Mean cell Volum 87.0  Platelet count-Automate 156  RBC Count 4.08  Red Cell Distrib Width12.7  WBC Count6.96  % Albumin, Urine--  Hematocrit 35.5  Hemoglobin 12.0  CBC  Date: 09-11-24 @ 03:11  Mean cell Epjifeupoq98.9  Mean cell Hemoglobin Conc33.2  Mean cell Volum 86.9  Platelet count-Automate 155  RBC Count 4.29  Red Cell Distrib Width12.7  WBC Count7.71  % Albumin, Urine--  Hematocrit 37.3  Hemoglobin 12.4  CBC  Date: 09-09-24 @ 19:55  Mean cell Hlwafwmtub44.1  Mean cell Hemoglobin Conc32.9  Mean cell Volum 88.3  Platelet count-Automate 187  RBC Count 4.71  Red Cell Distrib Width12.7  WBC Count8.46  % Albumin, Urine--  Hematocrit 41.6  Hemoglobin 13.7    BMP  09-13-24 @ 12:08  Blood Gas Arterial-Calcium,Ionized--  Blood Urea Nitrogen, Serum 14 mg/dL [10 - 20]  Carbon Dioxide, Serum27 mmol/L [17 - 32]  Chloride, Wncqr272 mmol/L [98 - 110]  Creatinie, Serum1.0 mg/dL [0.7 - 1.5]  Glucose, Krpst344 mg/dL<H> [70 - 99]  Potassium, Serum4.3 mmol/L [3.5 - 5.0]  Sodium, Serum 141 mmol/L [135 - 146]  BMP  09-12-24 @ 06:56  Blood Gas Arterial-Calcium,Ionized--  Blood Urea Nitrogen, Serum 17 mg/dL [10 - 20]  Carbon Dioxide, Serum26 mmol/L [17 - 32]  Chloride, Tustf089 mmol/L [98 - 110]  Creatinie, Serum1.0 mg/dL [0.7 - 1.5]  Glucose, Serum90 mg/dL [70 - 99]  Potassium, Serum4.1 mmol/L [3.5 - 5.0]  Sodium, Serum 141 mmol/L [135 - 146]              Microbiology:    Culture - Urine (collected 09-11-24 @ 11:14)  Source: Clean Catch Clean Catch (Midstream)  Final Report (09-12-24 @ 23:16):    No growth    Culture - Blood (collected 09-11-24 @ 03:11)  Source: .Blood Blood  Preliminary Report (09-15-24 @ 17:00):    No growth at 4 days    Culture - Blood (collected 09-11-24 @ 03:11)  Source: .Blood Blood  Preliminary Report (09-15-24 @ 17:00):    No growth at 4 days    Culture - Urine (collected 09-09-24 @ 19:55)  Source: Clean Catch Clean Catch (Midstream)  Final Report (09-12-24 @ 00:23):    <10,000 CFU/mL Normal Urogenital Lili      RADIOLOGY & ADDITIONAL TESTS:      Medications:  acetaminophen     Tablet .. 650 milliGRAM(s) Oral every 6 hours PRN  aluminum hydroxide/magnesium hydroxide/simethicone Suspension 30 milliLiter(s) Oral every 4 hours PRN  amLODIPine   Tablet 5 milliGRAM(s) Oral daily  aspirin enteric coated 81 milliGRAM(s) Oral daily  heparin   Injectable 5000 Unit(s) SubCutaneous every 12 hours  lamoTRIgine 100 milliGRAM(s) Oral two times a day  latanoprost 0.005% Ophthalmic Solution 1 Drop(s) Both EYES at bedtime  levETIRAcetam 750 milliGRAM(s) Oral two times a day  levothyroxine 88 MICROGram(s) Oral daily  ondansetron Injectable 4 milliGRAM(s) IV Push every 8 hours PRN  PARoxetine 30 milliGRAM(s) Oral daily  QUEtiapine 50 milliGRAM(s) Oral at bedtime  rosuvastatin 20 milliGRAM(s) Oral at bedtime        Assessment and Plan:  83 year old female past medical history of dementia, seizure, HTN, HLD, hypothyroidism comes to emergency room for AMS. patient in emergency room 2 days ago diagnosed with UTI and sent home with levaquin. confused over past 4 days with hematuria, had CT head for confusion and CTAP  that showed no acute pathology .  brought in again by family for continued  symptoms      # Acute metabolic encephalopathy-resolved  #UTI-resolved  Resolved, likely due to UTI  - D/benjamin Levaquin on 9/11, completed 3 days of IV ceftriaxone (9/11-9/13). Remains afebrile and HDS  with improvement in mental status. Plan to discharge to SNF.  - ID consult, appreciate recs  - Easy to chew diet  - UCx unremarkable however patient was on antibiotics at that time. BCx negativex5 days      # Seizures  - c/w levetiracetam and lamotrigine (home meds)  - seizure precautions     #Hepatocellular injury-resolved  -Mild and downtrending, suspect related to antibiotics vs. resolving viral syndrome    # HTN  # HLD  - c/w amlodipine   - c/w statin  - monitor BP  - DASH diet    # Hypothyroidism  - c/w synthroid     Code: Full  PPx: heparin  Diet: Easy to chew    #Progress note handoff: Pending consults none Tests none FamilyDiscussion: Family updated and agreeable with the plan of care Disposition:Patient is stable for discharge to SNF no active issues at this time.

## 2024-09-16 NOTE — DISCHARGE NOTE NURSING/CASE MANAGEMENT/SOCIAL WORK - NSDCPEFALRISK_GEN_ALL_CORE
For information on Fall & Injury Prevention, visit: [email or web address] OR  [email or web address] OR  https://www.cdc.gov/steadi/patient.html

## 2024-09-16 NOTE — DISCHARGE NOTE NURSING/CASE MANAGEMENT/SOCIAL WORK - NSDCVIVACCINE_GEN_ALL_CORE_FT
Tdap; 14-Nov-2018 15:07; Amico, Francine M (RN); [employer]; [id number] (Exp. Date: 02-Nov-2020); IntraMuscular; Deltoid Left.; 0.5 milliLiter(s); VIS (VIS Published: 09-May-2013, VIS Presented: 14-Nov-2018);

## 2024-09-16 NOTE — DISCHARGE NOTE NURSING/CASE MANAGEMENT/SOCIAL WORK - PATIENT PORTAL LINK FT
You can access the FollowMyHealth Patient Portal offered by [facility] by registering at the following website: [email or web address]. By joining [facility]’s FollowMyHealth portal, you will also be able to view your health information using other applications (apps) compatible with our system.

## 2024-09-20 DIAGNOSIS — E89.0 POSTPROCEDURAL HYPOTHYROIDISM: ICD-10-CM

## 2024-09-20 DIAGNOSIS — Z88.1 ALLERGY STATUS TO OTHER ANTIBIOTIC AGENTS: ICD-10-CM

## 2024-09-20 DIAGNOSIS — G93.41 METABOLIC ENCEPHALOPATHY: ICD-10-CM

## 2024-09-20 DIAGNOSIS — N39.0 URINARY TRACT INFECTION, SITE NOT SPECIFIED: ICD-10-CM

## 2024-09-20 DIAGNOSIS — E78.5 HYPERLIPIDEMIA, UNSPECIFIED: ICD-10-CM

## 2024-09-20 DIAGNOSIS — Z79.82 LONG TERM (CURRENT) USE OF ASPIRIN: ICD-10-CM

## 2024-09-20 DIAGNOSIS — K59.00 CONSTIPATION, UNSPECIFIED: ICD-10-CM

## 2024-09-20 DIAGNOSIS — Z88.0 ALLERGY STATUS TO PENICILLIN: ICD-10-CM

## 2024-09-20 DIAGNOSIS — R26.9 UNSPECIFIED ABNORMALITIES OF GAIT AND MOBILITY: ICD-10-CM

## 2024-09-20 DIAGNOSIS — F03.93 UNSPECIFIED DEMENTIA, UNSPECIFIED SEVERITY, WITH MOOD DISTURBANCE: ICD-10-CM

## 2024-09-20 DIAGNOSIS — F32.A DEPRESSION, UNSPECIFIED: ICD-10-CM

## 2024-09-20 DIAGNOSIS — R56.9 UNSPECIFIED CONVULSIONS: ICD-10-CM

## 2024-09-20 DIAGNOSIS — Z79.890 HORMONE REPLACEMENT THERAPY: ICD-10-CM

## 2024-09-20 DIAGNOSIS — I10 ESSENTIAL (PRIMARY) HYPERTENSION: ICD-10-CM

## 2024-10-28 ENCOUNTER — APPOINTMENT (OUTPATIENT)
Dept: VASCULAR SURGERY | Facility: CLINIC | Age: 84
End: 2024-10-28

## 2024-11-27 ENCOUNTER — INPATIENT (INPATIENT)
Facility: HOSPITAL | Age: 84
LOS: 2 days | Discharge: HOME HEALTH | DRG: 72 | End: 2024-11-30
Attending: INTERNAL MEDICINE | Admitting: FAMILY MEDICINE
Payer: MEDICARE

## 2024-11-27 VITALS
WEIGHT: 145.06 LBS | SYSTOLIC BLOOD PRESSURE: 147 MMHG | TEMPERATURE: 98 F | HEIGHT: 63 IN | HEART RATE: 77 BPM | OXYGEN SATURATION: 98 % | RESPIRATION RATE: 18 BRPM | DIASTOLIC BLOOD PRESSURE: 100 MMHG

## 2024-11-27 DIAGNOSIS — Z98.890 OTHER SPECIFIED POSTPROCEDURAL STATES: Chronic | ICD-10-CM

## 2024-11-27 LAB
ALBUMIN SERPL ELPH-MCNC: 4.1 G/DL — SIGNIFICANT CHANGE UP (ref 3.5–5.2)
ALP SERPL-CCNC: 101 U/L — SIGNIFICANT CHANGE UP (ref 30–115)
ALT FLD-CCNC: 17 U/L — SIGNIFICANT CHANGE UP (ref 0–41)
ANION GAP SERPL CALC-SCNC: 10 MMOL/L — SIGNIFICANT CHANGE UP (ref 7–14)
APTT BLD: 27.3 SEC — SIGNIFICANT CHANGE UP (ref 27–39.2)
AST SERPL-CCNC: 20 U/L — SIGNIFICANT CHANGE UP (ref 0–41)
BASOPHILS # BLD AUTO: 0.04 K/UL — SIGNIFICANT CHANGE UP (ref 0–0.2)
BASOPHILS NFR BLD AUTO: 0.3 % — SIGNIFICANT CHANGE UP (ref 0–1)
BILIRUB SERPL-MCNC: 0.4 MG/DL — SIGNIFICANT CHANGE UP (ref 0.2–1.2)
BUN SERPL-MCNC: 15 MG/DL — SIGNIFICANT CHANGE UP (ref 10–20)
CALCIUM SERPL-MCNC: 9.1 MG/DL — SIGNIFICANT CHANGE UP (ref 8.4–10.5)
CHLORIDE SERPL-SCNC: 98 MMOL/L — SIGNIFICANT CHANGE UP (ref 98–110)
CO2 SERPL-SCNC: 27 MMOL/L — SIGNIFICANT CHANGE UP (ref 17–32)
CREAT SERPL-MCNC: 1.2 MG/DL — SIGNIFICANT CHANGE UP (ref 0.7–1.5)
EGFR: 45 ML/MIN/1.73M2 — LOW
EGFR: 45 ML/MIN/1.73M2 — LOW
EOSINOPHIL # BLD AUTO: 0.33 K/UL — SIGNIFICANT CHANGE UP (ref 0–0.7)
EOSINOPHIL NFR BLD AUTO: 2.4 % — SIGNIFICANT CHANGE UP (ref 0–8)
FLUAV AG NPH QL: SIGNIFICANT CHANGE UP
FLUBV AG NPH QL: SIGNIFICANT CHANGE UP
GLUCOSE SERPL-MCNC: 133 MG/DL — HIGH (ref 70–99)
HCT VFR BLD CALC: 35.9 % — LOW (ref 37–47)
HGB BLD-MCNC: 11.6 G/DL — LOW (ref 12–16)
IMM GRANULOCYTES NFR BLD AUTO: 0.3 % — SIGNIFICANT CHANGE UP (ref 0.1–0.3)
INR BLD: 1 RATIO — SIGNIFICANT CHANGE UP (ref 0.65–1.3)
LACTATE SERPL-SCNC: 1.2 MMOL/L — SIGNIFICANT CHANGE UP (ref 0.7–2)
LYMPHOCYTES # BLD AUTO: 0.93 K/UL — LOW (ref 1.2–3.4)
LYMPHOCYTES # BLD AUTO: 6.7 % — LOW (ref 20.5–51.1)
MCHC RBC-ENTMCNC: 29.1 PG — SIGNIFICANT CHANGE UP (ref 27–31)
MCHC RBC-ENTMCNC: 32.3 G/DL — SIGNIFICANT CHANGE UP (ref 32–37)
MCV RBC AUTO: 90.2 FL — SIGNIFICANT CHANGE UP (ref 81–99)
MONOCYTES # BLD AUTO: 0.72 K/UL — HIGH (ref 0.1–0.6)
MONOCYTES NFR BLD AUTO: 5.2 % — SIGNIFICANT CHANGE UP (ref 1.7–9.3)
NEUTROPHILS # BLD AUTO: 11.76 K/UL — HIGH (ref 1.4–6.5)
NEUTROPHILS NFR BLD AUTO: 85.1 % — HIGH (ref 42.2–75.2)
NRBC # BLD: 0 /100 WBCS — SIGNIFICANT CHANGE UP (ref 0–0)
NRBC BLD-RTO: 0 /100 WBCS — SIGNIFICANT CHANGE UP (ref 0–0)
PLATELET # BLD AUTO: 264 K/UL — SIGNIFICANT CHANGE UP (ref 130–400)
PMV BLD: 9.8 FL — SIGNIFICANT CHANGE UP (ref 7.4–10.4)
POTASSIUM SERPL-MCNC: 4.5 MMOL/L — SIGNIFICANT CHANGE UP (ref 3.5–5)
POTASSIUM SERPL-SCNC: 4.5 MMOL/L — SIGNIFICANT CHANGE UP (ref 3.5–5)
PROT SERPL-MCNC: 7 G/DL — SIGNIFICANT CHANGE UP (ref 6–8)
PROTHROM AB SERPL-ACNC: 11.8 SEC — SIGNIFICANT CHANGE UP (ref 9.95–12.87)
RBC # BLD: 3.98 M/UL — LOW (ref 4.2–5.4)
RBC # FLD: 13.1 % — SIGNIFICANT CHANGE UP (ref 11.5–14.5)
RSV RNA NPH QL NAA+NON-PROBE: SIGNIFICANT CHANGE UP
SARS-COV-2 RNA SPEC QL NAA+PROBE: SIGNIFICANT CHANGE UP
SODIUM SERPL-SCNC: 135 MMOL/L — SIGNIFICANT CHANGE UP (ref 135–146)
WBC # BLD: 13.82 K/UL — HIGH (ref 4.8–10.8)
WBC # FLD AUTO: 13.82 K/UL — HIGH (ref 4.8–10.8)

## 2024-11-27 PROCEDURE — 70450 CT HEAD/BRAIN W/O DYE: CPT | Mod: 26,MC

## 2024-11-27 PROCEDURE — 71045 X-RAY EXAM CHEST 1 VIEW: CPT | Mod: 26

## 2024-11-27 PROCEDURE — 93010 ELECTROCARDIOGRAM REPORT: CPT

## 2024-11-27 PROCEDURE — 99285 EMERGENCY DEPT VISIT HI MDM: CPT | Mod: FS

## 2024-11-27 RX ORDER — SODIUM CHLORIDE 9 G/1000ML
2000 INJECTION, SOLUTION INTRAVENOUS ONCE
Refills: 0 | Status: COMPLETED | OUTPATIENT
Start: 2024-11-27 | End: 2024-11-27

## 2024-11-27 RX ADMIN — SODIUM CHLORIDE 2000 MILLILITER(S): 9 INJECTION, SOLUTION INTRAVENOUS at 23:37

## 2024-11-28 DIAGNOSIS — G93.41 METABOLIC ENCEPHALOPATHY: ICD-10-CM

## 2024-11-28 LAB
APPEARANCE UR: CLEAR — SIGNIFICANT CHANGE UP
BACTERIA # UR AUTO: ABNORMAL /HPF
BILIRUB UR-MCNC: NEGATIVE — SIGNIFICANT CHANGE UP
COLOR SPEC: YELLOW — SIGNIFICANT CHANGE UP
DIFF PNL FLD: NEGATIVE — SIGNIFICANT CHANGE UP
EPI CELLS # UR: PRESENT
GLUCOSE UR QL: NEGATIVE MG/DL — SIGNIFICANT CHANGE UP
HYALINE CASTS # UR AUTO: SIGNIFICANT CHANGE UP
KETONES UR-MCNC: NEGATIVE MG/DL — SIGNIFICANT CHANGE UP
LEUKOCYTE ESTERASE UR-ACNC: NEGATIVE — SIGNIFICANT CHANGE UP
NITRITE UR-MCNC: NEGATIVE — SIGNIFICANT CHANGE UP
PH UR: 5.5 — SIGNIFICANT CHANGE UP (ref 5–8)
PROT UR-MCNC: 30 MG/DL
RBC CASTS # UR COMP ASSIST: 1 /HPF — SIGNIFICANT CHANGE UP (ref 0–4)
SP GR SPEC: 1.01 — SIGNIFICANT CHANGE UP (ref 1–1.03)
SQUAMOUS # UR AUTO: SIGNIFICANT CHANGE UP /HPF (ref 0–5)
UROBILINOGEN FLD QL: 0.2 MG/DL — SIGNIFICANT CHANGE UP (ref 0.2–1)
WBC UR QL: 3 /HPF — SIGNIFICANT CHANGE UP (ref 0–5)

## 2024-11-28 PROCEDURE — 84100 ASSAY OF PHOSPHORUS: CPT

## 2024-11-28 PROCEDURE — 86780 TREPONEMA PALLIDUM: CPT

## 2024-11-28 PROCEDURE — 84443 ASSAY THYROID STIM HORMONE: CPT

## 2024-11-28 PROCEDURE — 83735 ASSAY OF MAGNESIUM: CPT

## 2024-11-28 PROCEDURE — 82607 VITAMIN B-12: CPT

## 2024-11-28 PROCEDURE — 82746 ASSAY OF FOLIC ACID SERUM: CPT

## 2024-11-28 PROCEDURE — 92610 EVALUATE SWALLOWING FUNCTION: CPT | Mod: GN

## 2024-11-28 PROCEDURE — 99222 1ST HOSP IP/OBS MODERATE 55: CPT | Mod: FS

## 2024-11-28 PROCEDURE — 36415 COLL VENOUS BLD VENIPUNCTURE: CPT

## 2024-11-28 PROCEDURE — 85027 COMPLETE CBC AUTOMATED: CPT

## 2024-11-28 PROCEDURE — 80048 BASIC METABOLIC PNL TOTAL CA: CPT

## 2024-11-28 RX ORDER — ASPIRIN 325 MG
81 TABLET ORAL DAILY
Refills: 0 | Status: DISCONTINUED | OUTPATIENT
Start: 2024-11-28 | End: 2024-11-30

## 2024-11-28 RX ORDER — LEVETIRACETAM 10 MG/ML
750 INJECTION, SOLUTION INTRAVENOUS
Refills: 0 | Status: DISCONTINUED | OUTPATIENT
Start: 2024-11-28 | End: 2024-11-30

## 2024-11-28 RX ORDER — PAROXETINE HYDROCHLORIDE 20 MG/1
30 TABLET, FILM COATED ORAL DAILY
Refills: 0 | Status: DISCONTINUED | OUTPATIENT
Start: 2024-11-28 | End: 2024-11-30

## 2024-11-28 RX ORDER — AMLODIPINE BESYLATE 10 MG/1
5 TABLET ORAL DAILY
Refills: 0 | Status: DISCONTINUED | OUTPATIENT
Start: 2024-11-28 | End: 2024-11-30

## 2024-11-28 RX ORDER — CEFTRIAXONE 500 MG/1
1000 INJECTION, POWDER, FOR SOLUTION INTRAMUSCULAR; INTRAVENOUS ONCE
Refills: 0 | Status: COMPLETED | OUTPATIENT
Start: 2024-11-28 | End: 2024-11-28

## 2024-11-28 RX ORDER — LATANOPROST PF 0.05 MG/ML
1 SOLUTION/ DROPS OPHTHALMIC AT BEDTIME
Refills: 0 | Status: DISCONTINUED | OUTPATIENT
Start: 2024-11-28 | End: 2024-11-30

## 2024-11-28 RX ORDER — LAMOTRIGINE 150 MG/1
100 TABLET ORAL
Refills: 0 | Status: DISCONTINUED | OUTPATIENT
Start: 2024-11-28 | End: 2024-11-30

## 2024-11-28 RX ORDER — CEFTRIAXONE 500 MG/1
1000 INJECTION, POWDER, FOR SOLUTION INTRAMUSCULAR; INTRAVENOUS EVERY 24 HOURS
Refills: 0 | Status: COMPLETED | OUTPATIENT
Start: 2024-11-28 | End: 2024-11-30

## 2024-11-28 RX ORDER — HEPARIN SODIUM 1000 [USP'U]/ML
5000 INJECTION INTRAVENOUS; SUBCUTANEOUS EVERY 8 HOURS
Refills: 0 | Status: DISCONTINUED | OUTPATIENT
Start: 2024-11-28 | End: 2024-11-30

## 2024-11-28 RX ORDER — QUETIAPINE FUMARATE 25 MG/1
50 TABLET ORAL AT BEDTIME
Refills: 0 | Status: DISCONTINUED | OUTPATIENT
Start: 2024-11-28 | End: 2024-11-30

## 2024-11-28 RX ORDER — LEVOTHYROXINE SODIUM 300 MCG
88 TABLET ORAL DAILY
Refills: 0 | Status: DISCONTINUED | OUTPATIENT
Start: 2024-11-28 | End: 2024-11-30

## 2024-11-28 RX ORDER — ROSUVASTATIN CALCIUM 20 MG/1
20 TABLET, FILM COATED ORAL AT BEDTIME
Refills: 0 | Status: DISCONTINUED | OUTPATIENT
Start: 2024-11-28 | End: 2024-11-30

## 2024-11-28 RX ADMIN — HEPARIN SODIUM 5000 UNIT(S): 1000 INJECTION INTRAVENOUS; SUBCUTANEOUS at 21:14

## 2024-11-28 RX ADMIN — ROSUVASTATIN CALCIUM 20 MILLIGRAM(S): 20 TABLET, FILM COATED ORAL at 21:15

## 2024-11-28 RX ADMIN — CEFTRIAXONE 100 MILLIGRAM(S): 500 INJECTION, POWDER, FOR SOLUTION INTRAMUSCULAR; INTRAVENOUS at 03:44

## 2024-11-28 RX ADMIN — HEPARIN SODIUM 5000 UNIT(S): 1000 INJECTION INTRAVENOUS; SUBCUTANEOUS at 05:53

## 2024-11-28 RX ADMIN — LAMOTRIGINE 100 MILLIGRAM(S): 150 TABLET ORAL at 17:09

## 2024-11-28 RX ADMIN — Medication 1 APPLICATION(S): at 05:53

## 2024-11-28 RX ADMIN — QUETIAPINE FUMARATE 50 MILLIGRAM(S): 25 TABLET ORAL at 21:14

## 2024-11-28 RX ADMIN — LAMOTRIGINE 100 MILLIGRAM(S): 150 TABLET ORAL at 06:23

## 2024-11-28 RX ADMIN — Medication 88 MICROGRAM(S): at 05:53

## 2024-11-28 RX ADMIN — LATANOPROST PF 1 DROP(S): 0.05 SOLUTION/ DROPS OPHTHALMIC at 21:15

## 2024-11-28 RX ADMIN — Medication 75 MILLILITER(S): at 05:52

## 2024-11-28 RX ADMIN — PAROXETINE HYDROCHLORIDE 30 MILLIGRAM(S): 20 TABLET, FILM COATED ORAL at 11:08

## 2024-11-28 RX ADMIN — AMLODIPINE BESYLATE 5 MILLIGRAM(S): 10 TABLET ORAL at 05:53

## 2024-11-28 RX ADMIN — Medication 81 MILLIGRAM(S): at 11:08

## 2024-11-28 RX ADMIN — LEVETIRACETAM 750 MILLIGRAM(S): 10 INJECTION, SOLUTION INTRAVENOUS at 17:09

## 2024-11-28 RX ADMIN — LEVETIRACETAM 750 MILLIGRAM(S): 10 INJECTION, SOLUTION INTRAVENOUS at 06:23

## 2024-11-29 LAB
ANION GAP SERPL CALC-SCNC: 15 MMOL/L — HIGH (ref 7–14)
BUN SERPL-MCNC: 8 MG/DL — LOW (ref 10–20)
CALCIUM SERPL-MCNC: 9.1 MG/DL — SIGNIFICANT CHANGE UP (ref 8.4–10.5)
CHLORIDE SERPL-SCNC: 105 MMOL/L — SIGNIFICANT CHANGE UP (ref 98–110)
CO2 SERPL-SCNC: 21 MMOL/L — SIGNIFICANT CHANGE UP (ref 17–32)
CREAT SERPL-MCNC: 0.9 MG/DL — SIGNIFICANT CHANGE UP (ref 0.7–1.5)
CULTURE RESULTS: NO GROWTH — SIGNIFICANT CHANGE UP
EGFR: 63 ML/MIN/1.73M2 — SIGNIFICANT CHANGE UP
EGFR: 63 ML/MIN/1.73M2 — SIGNIFICANT CHANGE UP
FOLATE SERPL-MCNC: 6.9 NG/ML — SIGNIFICANT CHANGE UP
GLUCOSE SERPL-MCNC: 97 MG/DL — SIGNIFICANT CHANGE UP (ref 70–99)
HCT VFR BLD CALC: 37.4 % — SIGNIFICANT CHANGE UP (ref 37–47)
HGB BLD-MCNC: 12.5 G/DL — SIGNIFICANT CHANGE UP (ref 12–16)
MAGNESIUM SERPL-MCNC: 1.9 MG/DL — SIGNIFICANT CHANGE UP (ref 1.8–2.4)
MCHC RBC-ENTMCNC: 29.8 PG — SIGNIFICANT CHANGE UP (ref 27–31)
MCHC RBC-ENTMCNC: 33.4 G/DL — SIGNIFICANT CHANGE UP (ref 32–37)
MCV RBC AUTO: 89 FL — SIGNIFICANT CHANGE UP (ref 81–99)
NRBC # BLD: 0 /100 WBCS — SIGNIFICANT CHANGE UP (ref 0–0)
NRBC BLD-RTO: 0 /100 WBCS — SIGNIFICANT CHANGE UP (ref 0–0)
PHOSPHATE SERPL-MCNC: 3.2 MG/DL — SIGNIFICANT CHANGE UP (ref 2.1–4.9)
PLATELET # BLD AUTO: 286 K/UL — SIGNIFICANT CHANGE UP (ref 130–400)
PMV BLD: 10.4 FL — SIGNIFICANT CHANGE UP (ref 7.4–10.4)
POTASSIUM SERPL-MCNC: 4.2 MMOL/L — SIGNIFICANT CHANGE UP (ref 3.5–5)
POTASSIUM SERPL-SCNC: 4.2 MMOL/L — SIGNIFICANT CHANGE UP (ref 3.5–5)
RBC # BLD: 4.2 M/UL — SIGNIFICANT CHANGE UP (ref 4.2–5.4)
RBC # FLD: 13.2 % — SIGNIFICANT CHANGE UP (ref 11.5–14.5)
SODIUM SERPL-SCNC: 141 MMOL/L — SIGNIFICANT CHANGE UP (ref 135–146)
SPECIMEN SOURCE: SIGNIFICANT CHANGE UP
T PALLIDUM AB TITR SER: NEGATIVE — SIGNIFICANT CHANGE UP
TSH SERPL-MCNC: 5.12 UIU/ML — HIGH (ref 0.27–4.2)
TSH SERPL-MCNC: 5.23 UIU/ML — HIGH (ref 0.27–4.2)
VIT B12 SERPL-MCNC: 850 PG/ML — SIGNIFICANT CHANGE UP (ref 232–1245)
WBC # BLD: 8.36 K/UL — SIGNIFICANT CHANGE UP (ref 4.8–10.8)
WBC # FLD AUTO: 8.36 K/UL — SIGNIFICANT CHANGE UP (ref 4.8–10.8)

## 2024-11-29 PROCEDURE — 99232 SBSQ HOSP IP/OBS MODERATE 35: CPT

## 2024-11-29 RX ADMIN — Medication 75 MILLILITER(S): at 05:08

## 2024-11-29 RX ADMIN — LEVETIRACETAM 750 MILLIGRAM(S): 10 INJECTION, SOLUTION INTRAVENOUS at 17:07

## 2024-11-29 RX ADMIN — LAMOTRIGINE 100 MILLIGRAM(S): 150 TABLET ORAL at 17:06

## 2024-11-29 RX ADMIN — PAROXETINE HYDROCHLORIDE 30 MILLIGRAM(S): 20 TABLET, FILM COATED ORAL at 11:47

## 2024-11-29 RX ADMIN — Medication 81 MILLIGRAM(S): at 11:47

## 2024-11-29 RX ADMIN — LEVETIRACETAM 750 MILLIGRAM(S): 10 INJECTION, SOLUTION INTRAVENOUS at 05:09

## 2024-11-29 RX ADMIN — HEPARIN SODIUM 5000 UNIT(S): 1000 INJECTION INTRAVENOUS; SUBCUTANEOUS at 21:24

## 2024-11-29 RX ADMIN — LATANOPROST PF 1 DROP(S): 0.05 SOLUTION/ DROPS OPHTHALMIC at 21:23

## 2024-11-29 RX ADMIN — Medication 88 MICROGRAM(S): at 05:09

## 2024-11-29 RX ADMIN — CEFTRIAXONE 100 MILLIGRAM(S): 500 INJECTION, POWDER, FOR SOLUTION INTRAMUSCULAR; INTRAVENOUS at 05:07

## 2024-11-29 RX ADMIN — AMLODIPINE BESYLATE 5 MILLIGRAM(S): 10 TABLET ORAL at 05:09

## 2024-11-29 RX ADMIN — HEPARIN SODIUM 5000 UNIT(S): 1000 INJECTION INTRAVENOUS; SUBCUTANEOUS at 05:08

## 2024-11-29 RX ADMIN — LAMOTRIGINE 100 MILLIGRAM(S): 150 TABLET ORAL at 05:09

## 2024-11-29 RX ADMIN — ROSUVASTATIN CALCIUM 20 MILLIGRAM(S): 20 TABLET, FILM COATED ORAL at 21:22

## 2024-11-29 RX ADMIN — QUETIAPINE FUMARATE 50 MILLIGRAM(S): 25 TABLET ORAL at 21:22

## 2024-11-29 RX ADMIN — HEPARIN SODIUM 5000 UNIT(S): 1000 INJECTION INTRAVENOUS; SUBCUTANEOUS at 13:48

## 2024-11-30 ENCOUNTER — TRANSCRIPTION ENCOUNTER (OUTPATIENT)
Age: 84
End: 2024-11-30

## 2024-11-30 VITALS
RESPIRATION RATE: 18 BRPM | SYSTOLIC BLOOD PRESSURE: 115 MMHG | OXYGEN SATURATION: 94 % | TEMPERATURE: 98 F | DIASTOLIC BLOOD PRESSURE: 59 MMHG | HEART RATE: 71 BPM

## 2024-11-30 PROCEDURE — 99239 HOSP IP/OBS DSCHRG MGMT >30: CPT

## 2024-11-30 RX ORDER — PAROXETINE HYDROCHLORIDE 20 MG/1
1 TABLET, FILM COATED ORAL
Qty: 0 | Refills: 0 | DISCHARGE
Start: 2024-11-30

## 2024-11-30 RX ORDER — ROSUVASTATIN CALCIUM 20 MG/1
1 TABLET, FILM COATED ORAL
Qty: 0 | Refills: 0 | DISCHARGE
Start: 2024-11-30

## 2024-11-30 RX ORDER — ASPIRIN 325 MG
1 TABLET ORAL
Qty: 0 | Refills: 0 | DISCHARGE
Start: 2024-11-30

## 2024-11-30 RX ORDER — QUETIAPINE FUMARATE 25 MG/1
1 TABLET ORAL
Refills: 0 | DISCHARGE
Start: 2024-11-30

## 2024-11-30 RX ORDER — LEVOTHYROXINE SODIUM 300 MCG
1 TABLET ORAL
Qty: 0 | Refills: 0 | DISCHARGE
Start: 2024-11-30

## 2024-11-30 RX ORDER — AMLODIPINE BESYLATE 10 MG/1
1 TABLET ORAL
Qty: 0 | Refills: 0 | DISCHARGE
Start: 2024-11-30

## 2024-11-30 RX ORDER — LEVETIRACETAM 10 MG/ML
1 INJECTION, SOLUTION INTRAVENOUS
Refills: 0 | DISCHARGE
Start: 2024-11-30

## 2024-11-30 RX ORDER — LAMOTRIGINE 150 MG/1
1 TABLET ORAL
Refills: 0 | DISCHARGE
Start: 2024-11-30

## 2024-11-30 RX ORDER — LATANOPROST PF 0.05 MG/ML
1 SOLUTION/ DROPS OPHTHALMIC
Qty: 0 | Refills: 0 | DISCHARGE
Start: 2024-11-30

## 2024-11-30 RX ADMIN — HEPARIN SODIUM 5000 UNIT(S): 1000 INJECTION INTRAVENOUS; SUBCUTANEOUS at 05:30

## 2024-11-30 RX ADMIN — Medication 81 MILLIGRAM(S): at 11:31

## 2024-11-30 RX ADMIN — LAMOTRIGINE 100 MILLIGRAM(S): 150 TABLET ORAL at 05:29

## 2024-11-30 RX ADMIN — PAROXETINE HYDROCHLORIDE 30 MILLIGRAM(S): 20 TABLET, FILM COATED ORAL at 11:31

## 2024-11-30 RX ADMIN — AMLODIPINE BESYLATE 5 MILLIGRAM(S): 10 TABLET ORAL at 05:30

## 2024-11-30 RX ADMIN — CEFTRIAXONE 100 MILLIGRAM(S): 500 INJECTION, POWDER, FOR SOLUTION INTRAMUSCULAR; INTRAVENOUS at 05:28

## 2024-11-30 RX ADMIN — LEVETIRACETAM 750 MILLIGRAM(S): 10 INJECTION, SOLUTION INTRAVENOUS at 05:29

## 2024-11-30 RX ADMIN — Medication 1 APPLICATION(S): at 05:31

## 2024-11-30 RX ADMIN — Medication 88 MICROGRAM(S): at 05:30

## 2024-12-09 DIAGNOSIS — I10 ESSENTIAL (PRIMARY) HYPERTENSION: ICD-10-CM

## 2024-12-09 DIAGNOSIS — G40.909 EPILEPSY, UNSPECIFIED, NOT INTRACTABLE, WITHOUT STATUS EPILEPTICUS: ICD-10-CM

## 2024-12-09 DIAGNOSIS — Z74.3 NEED FOR CONTINUOUS SUPERVISION: ICD-10-CM

## 2024-12-09 DIAGNOSIS — Z88.0 ALLERGY STATUS TO PENICILLIN: ICD-10-CM

## 2024-12-09 DIAGNOSIS — Z79.899 OTHER LONG TERM (CURRENT) DRUG THERAPY: ICD-10-CM

## 2024-12-09 DIAGNOSIS — Z88.8 ALLERGY STATUS TO OTHER DRUGS, MEDICAMENTS AND BIOLOGICAL SUBSTANCES: ICD-10-CM

## 2024-12-09 DIAGNOSIS — Z79.82 LONG TERM (CURRENT) USE OF ASPIRIN: ICD-10-CM

## 2024-12-09 DIAGNOSIS — E78.00 PURE HYPERCHOLESTEROLEMIA, UNSPECIFIED: ICD-10-CM

## 2024-12-09 DIAGNOSIS — E89.0 POSTPROCEDURAL HYPOTHYROIDISM: ICD-10-CM

## 2024-12-09 DIAGNOSIS — G93.41 METABOLIC ENCEPHALOPATHY: ICD-10-CM

## 2024-12-09 DIAGNOSIS — R26.0 ATAXIC GAIT: ICD-10-CM

## 2024-12-09 DIAGNOSIS — Z79.890 HORMONE REPLACEMENT THERAPY: ICD-10-CM

## 2024-12-09 DIAGNOSIS — N39.0 URINARY TRACT INFECTION, SITE NOT SPECIFIED: ICD-10-CM

## 2024-12-09 DIAGNOSIS — Z91.041 RADIOGRAPHIC DYE ALLERGY STATUS: ICD-10-CM

## 2024-12-09 DIAGNOSIS — F03.93 UNSPECIFIED DEMENTIA, UNSPECIFIED SEVERITY, WITH MOOD DISTURBANCE: ICD-10-CM

## 2025-08-21 ENCOUNTER — EMERGENCY (EMERGENCY)
Facility: HOSPITAL | Age: 85
LOS: 0 days | Discharge: HOME | End: 2025-08-21
Attending: STUDENT IN AN ORGANIZED HEALTH CARE EDUCATION/TRAINING PROGRAM
Payer: MEDICARE

## 2025-08-21 VITALS
RESPIRATION RATE: 18 BRPM | DIASTOLIC BLOOD PRESSURE: 63 MMHG | HEART RATE: 86 BPM | WEIGHT: 160.06 LBS | OXYGEN SATURATION: 92 % | SYSTOLIC BLOOD PRESSURE: 116 MMHG | HEIGHT: 60 IN | TEMPERATURE: 98 F

## 2025-08-21 VITALS
SYSTOLIC BLOOD PRESSURE: 109 MMHG | OXYGEN SATURATION: 95 % | RESPIRATION RATE: 18 BRPM | TEMPERATURE: 98 F | HEART RATE: 87 BPM | DIASTOLIC BLOOD PRESSURE: 64 MMHG

## 2025-08-21 DIAGNOSIS — Z98.890 OTHER SPECIFIED POSTPROCEDURAL STATES: Chronic | ICD-10-CM

## 2025-08-21 LAB
ALBUMIN SERPL ELPH-MCNC: 4.1 G/DL — SIGNIFICANT CHANGE UP (ref 3.5–5.2)
ALP SERPL-CCNC: 79 U/L — SIGNIFICANT CHANGE UP (ref 30–115)
ALT FLD-CCNC: 20 U/L — SIGNIFICANT CHANGE UP (ref 0–41)
ANION GAP SERPL CALC-SCNC: 13 MMOL/L — SIGNIFICANT CHANGE UP (ref 7–14)
APPEARANCE UR: CLEAR — SIGNIFICANT CHANGE UP
AST SERPL-CCNC: 26 U/L — SIGNIFICANT CHANGE UP (ref 0–41)
BACTERIA # UR AUTO: ABNORMAL /HPF
BASOPHILS # BLD AUTO: 0.03 K/UL — SIGNIFICANT CHANGE UP (ref 0–0.2)
BASOPHILS NFR BLD AUTO: 0.5 % — SIGNIFICANT CHANGE UP (ref 0–2)
BILIRUB SERPL-MCNC: 0.3 MG/DL — SIGNIFICANT CHANGE UP (ref 0.2–1.2)
BILIRUB UR-MCNC: NEGATIVE — SIGNIFICANT CHANGE UP
BUN SERPL-MCNC: 35 MG/DL — HIGH (ref 10–20)
CALCIUM SERPL-MCNC: 8.9 MG/DL — SIGNIFICANT CHANGE UP (ref 8.4–10.5)
CHLORIDE SERPL-SCNC: 105 MMOL/L — SIGNIFICANT CHANGE UP (ref 98–110)
CO2 SERPL-SCNC: 23 MMOL/L — SIGNIFICANT CHANGE UP (ref 17–32)
COLOR SPEC: YELLOW — SIGNIFICANT CHANGE UP
CREAT SERPL-MCNC: 1.5 MG/DL — SIGNIFICANT CHANGE UP (ref 0.7–1.5)
DIFF PNL FLD: NEGATIVE — SIGNIFICANT CHANGE UP
EGFR: 34 ML/MIN/1.73M2 — LOW
EGFR: 34 ML/MIN/1.73M2 — LOW
EOSINOPHIL # BLD AUTO: 0.49 K/UL — SIGNIFICANT CHANGE UP (ref 0–0.5)
EOSINOPHIL NFR BLD AUTO: 8 % — HIGH (ref 0–6)
EPI CELLS # UR: PRESENT
GLUCOSE SERPL-MCNC: 122 MG/DL — HIGH (ref 70–99)
GLUCOSE UR QL: NEGATIVE MG/DL — SIGNIFICANT CHANGE UP
HCT VFR BLD CALC: 34.2 % — LOW (ref 34.5–45)
HGB BLD-MCNC: 11 G/DL — LOW (ref 11.5–15.5)
IMM GRANULOCYTES # BLD AUTO: 0.06 K/UL — SIGNIFICANT CHANGE UP (ref 0–0.07)
IMM GRANULOCYTES NFR BLD AUTO: 1 % — HIGH (ref 0–0.9)
KETONES UR QL: NEGATIVE MG/DL — SIGNIFICANT CHANGE UP
LEUKOCYTE ESTERASE UR-ACNC: ABNORMAL
LYMPHOCYTES # BLD AUTO: 1.22 K/UL — SIGNIFICANT CHANGE UP (ref 1–3.3)
LYMPHOCYTES NFR BLD AUTO: 20 % — SIGNIFICANT CHANGE UP (ref 13–44)
MCHC RBC-ENTMCNC: 29.1 PG — SIGNIFICANT CHANGE UP (ref 27–34)
MCHC RBC-ENTMCNC: 32.2 G/DL — SIGNIFICANT CHANGE UP (ref 32–36)
MCV RBC AUTO: 90.5 FL — SIGNIFICANT CHANGE UP (ref 80–100)
MONOCYTES # BLD AUTO: 0.56 K/UL — SIGNIFICANT CHANGE UP (ref 0–0.9)
MONOCYTES NFR BLD AUTO: 9.2 % — SIGNIFICANT CHANGE UP (ref 2–14)
NEUTROPHILS # BLD AUTO: 3.75 K/UL — SIGNIFICANT CHANGE UP (ref 1.8–7.4)
NEUTROPHILS NFR BLD AUTO: 61.3 % — SIGNIFICANT CHANGE UP (ref 43–77)
NITRITE UR-MCNC: NEGATIVE — SIGNIFICANT CHANGE UP
NRBC # BLD AUTO: 0 K/UL — SIGNIFICANT CHANGE UP (ref 0–0)
NRBC # FLD: 0 K/UL — SIGNIFICANT CHANGE UP (ref 0–0)
NRBC BLD AUTO-RTO: 0 /100 WBCS — SIGNIFICANT CHANGE UP (ref 0–0)
PH UR: 6 — SIGNIFICANT CHANGE UP (ref 5–8)
PLATELET # BLD AUTO: 209 K/UL — SIGNIFICANT CHANGE UP (ref 150–400)
PMV BLD: 9.8 FL — SIGNIFICANT CHANGE UP (ref 7–13)
POTASSIUM SERPL-MCNC: 4.6 MMOL/L — SIGNIFICANT CHANGE UP (ref 3.5–5)
POTASSIUM SERPL-SCNC: 4.6 MMOL/L — SIGNIFICANT CHANGE UP (ref 3.5–5)
PROT SERPL-MCNC: 7.5 G/DL — SIGNIFICANT CHANGE UP (ref 6–8)
PROT UR-MCNC: SIGNIFICANT CHANGE UP MG/DL
RBC # BLD: 3.78 M/UL — LOW (ref 3.8–5.2)
RBC # FLD: 12.7 % — SIGNIFICANT CHANGE UP (ref 10.3–14.5)
RBC CASTS # UR COMP ASSIST: 1 /HPF — SIGNIFICANT CHANGE UP (ref 0–4)
SODIUM SERPL-SCNC: 141 MMOL/L — SIGNIFICANT CHANGE UP (ref 135–146)
SP GR SPEC: 1.01 — SIGNIFICANT CHANGE UP (ref 1–1.03)
TROPONIN T, HIGH SENSITIVITY RESULT: 13 NG/L — SIGNIFICANT CHANGE UP (ref 6–13)
TROPONIN T, HIGH SENSITIVITY RESULT: 14 NG/L — HIGH (ref 6–13)
UROBILINOGEN FLD QL: 0.2 MG/DL — SIGNIFICANT CHANGE UP (ref 0.2–1)
WBC # BLD: 6.11 K/UL — SIGNIFICANT CHANGE UP (ref 3.8–10.5)
WBC # FLD AUTO: 6.11 K/UL — SIGNIFICANT CHANGE UP (ref 3.8–10.5)
WBC UR QL: 14 /HPF — HIGH (ref 0–5)

## 2025-08-21 PROCEDURE — 87086 URINE CULTURE/COLONY COUNT: CPT

## 2025-08-21 PROCEDURE — 70486 CT MAXILLOFACIAL W/O DYE: CPT | Mod: 26

## 2025-08-21 PROCEDURE — 70450 CT HEAD/BRAIN W/O DYE: CPT

## 2025-08-21 PROCEDURE — 72125 CT NECK SPINE W/O DYE: CPT | Mod: 26

## 2025-08-21 PROCEDURE — 90471 IMMUNIZATION ADMIN: CPT

## 2025-08-21 PROCEDURE — 93005 ELECTROCARDIOGRAM TRACING: CPT

## 2025-08-21 PROCEDURE — 99285 EMERGENCY DEPT VISIT HI MDM: CPT | Mod: FS

## 2025-08-21 PROCEDURE — 36415 COLL VENOUS BLD VENIPUNCTURE: CPT

## 2025-08-21 PROCEDURE — 85025 COMPLETE CBC W/AUTO DIFF WBC: CPT

## 2025-08-21 PROCEDURE — 90715 TDAP VACCINE 7 YRS/> IM: CPT

## 2025-08-21 PROCEDURE — 84484 ASSAY OF TROPONIN QUANT: CPT

## 2025-08-21 PROCEDURE — 70450 CT HEAD/BRAIN W/O DYE: CPT | Mod: 26

## 2025-08-21 PROCEDURE — 80053 COMPREHEN METABOLIC PANEL: CPT

## 2025-08-21 PROCEDURE — 93010 ELECTROCARDIOGRAM REPORT: CPT

## 2025-08-21 PROCEDURE — 12014 RPR F/E/E/N/L/M 5.1-7.5 CM: CPT

## 2025-08-21 PROCEDURE — 81001 URINALYSIS AUTO W/SCOPE: CPT

## 2025-08-21 PROCEDURE — 99285 EMERGENCY DEPT VISIT HI MDM: CPT | Mod: 25

## 2025-08-21 PROCEDURE — 71045 X-RAY EXAM CHEST 1 VIEW: CPT | Mod: 26

## 2025-08-21 PROCEDURE — 72125 CT NECK SPINE W/O DYE: CPT

## 2025-08-21 PROCEDURE — 71045 X-RAY EXAM CHEST 1 VIEW: CPT

## 2025-08-21 PROCEDURE — 70486 CT MAXILLOFACIAL W/O DYE: CPT

## 2025-08-21 RX ORDER — SULFAMETHOXAZOLE/TRIMETHOPRIM 800-160 MG
1 TABLET ORAL
Qty: 6 | Refills: 0
Start: 2025-08-21 | End: 2025-08-23

## 2025-08-21 RX ORDER — LIDOCAINE HCL/EPINEPHRINE/PF 1 %-1:200K
10 AMPUL (ML) INJECTION ONCE
Refills: 0 | Status: COMPLETED | OUTPATIENT
Start: 2025-08-21 | End: 2025-08-21

## 2025-08-21 RX ORDER — QUETIAPINE FUMARATE 25 MG/1
50 TABLET ORAL ONCE
Refills: 0 | Status: COMPLETED | OUTPATIENT
Start: 2025-08-21 | End: 2025-08-21

## 2025-08-21 RX ORDER — CEPHALEXIN 250 MG/1
1 CAPSULE ORAL
Refills: 0
Start: 2025-08-21

## 2025-08-21 RX ADMIN — QUETIAPINE FUMARATE 50 MILLIGRAM(S): 25 TABLET ORAL at 15:44

## 2025-08-21 RX ADMIN — Medication 10 MILLILITER(S): at 11:42

## 2025-08-22 LAB
CULTURE RESULTS: SIGNIFICANT CHANGE UP
SPECIMEN SOURCE: SIGNIFICANT CHANGE UP

## 2025-08-26 ENCOUNTER — INPATIENT (INPATIENT)
Facility: HOSPITAL | Age: 85
LOS: 6 days | Discharge: NURSING, REHAB OR LONG-TERM CARE | DRG: 684 | End: 2025-09-02
Attending: STUDENT IN AN ORGANIZED HEALTH CARE EDUCATION/TRAINING PROGRAM | Admitting: HOSPITALIST
Payer: MEDICARE

## 2025-08-26 VITALS
DIASTOLIC BLOOD PRESSURE: 53 MMHG | OXYGEN SATURATION: 95 % | TEMPERATURE: 97 F | HEIGHT: 60 IN | RESPIRATION RATE: 18 BRPM | HEART RATE: 74 BPM | WEIGHT: 160.06 LBS | SYSTOLIC BLOOD PRESSURE: 113 MMHG

## 2025-08-26 DIAGNOSIS — N17.9 ACUTE KIDNEY FAILURE, UNSPECIFIED: ICD-10-CM

## 2025-08-26 DIAGNOSIS — Z98.890 OTHER SPECIFIED POSTPROCEDURAL STATES: Chronic | ICD-10-CM

## 2025-08-26 PROBLEM — I10 ESSENTIAL (PRIMARY) HYPERTENSION: Chronic | Status: ACTIVE | Noted: 2025-08-21

## 2025-08-26 PROBLEM — H40.9 UNSPECIFIED GLAUCOMA: Chronic | Status: ACTIVE | Noted: 2025-08-21

## 2025-08-26 LAB
ALBUMIN SERPL ELPH-MCNC: 4.1 G/DL — SIGNIFICANT CHANGE UP (ref 3.5–5.2)
ALP SERPL-CCNC: 90 U/L — SIGNIFICANT CHANGE UP (ref 30–115)
ALT FLD-CCNC: 18 U/L — SIGNIFICANT CHANGE UP (ref 0–41)
ANION GAP SERPL CALC-SCNC: 15 MMOL/L — HIGH (ref 7–14)
ANION GAP SERPL CALC-SCNC: 16 MMOL/L — HIGH (ref 7–14)
APPEARANCE UR: CLEAR — SIGNIFICANT CHANGE UP
AST SERPL-CCNC: 31 U/L — SIGNIFICANT CHANGE UP (ref 0–41)
BACTERIA # UR AUTO: ABNORMAL /HPF
BASOPHILS # BLD AUTO: 0.04 K/UL — SIGNIFICANT CHANGE UP (ref 0–0.2)
BASOPHILS NFR BLD AUTO: 0.4 % — SIGNIFICANT CHANGE UP (ref 0–2)
BILIRUB SERPL-MCNC: 0.3 MG/DL — SIGNIFICANT CHANGE UP (ref 0.2–1.2)
BILIRUB UR-MCNC: NEGATIVE — SIGNIFICANT CHANGE UP
BUN SERPL-MCNC: 34 MG/DL — HIGH (ref 10–20)
BUN SERPL-MCNC: 35 MG/DL — HIGH (ref 10–20)
CALCIUM SERPL-MCNC: 8.8 MG/DL — SIGNIFICANT CHANGE UP (ref 8.4–10.5)
CALCIUM SERPL-MCNC: 8.9 MG/DL — SIGNIFICANT CHANGE UP (ref 8.4–10.5)
CHLORIDE SERPL-SCNC: 100 MMOL/L — SIGNIFICANT CHANGE UP (ref 98–110)
CHLORIDE SERPL-SCNC: 103 MMOL/L — SIGNIFICANT CHANGE UP (ref 98–110)
CO2 SERPL-SCNC: 19 MMOL/L — SIGNIFICANT CHANGE UP (ref 17–32)
CO2 SERPL-SCNC: 19 MMOL/L — SIGNIFICANT CHANGE UP (ref 17–32)
COLOR SPEC: YELLOW — SIGNIFICANT CHANGE UP
CREAT SERPL-MCNC: 1.9 MG/DL — HIGH (ref 0.7–1.5)
CREAT SERPL-MCNC: 2 MG/DL — HIGH (ref 0.7–1.5)
DIFF PNL FLD: NEGATIVE — SIGNIFICANT CHANGE UP
EGFR: 24 ML/MIN/1.73M2 — LOW
EGFR: 24 ML/MIN/1.73M2 — LOW
EGFR: 26 ML/MIN/1.73M2 — LOW
EGFR: 26 ML/MIN/1.73M2 — LOW
EOSINOPHIL # BLD AUTO: 0.37 K/UL — SIGNIFICANT CHANGE UP (ref 0–0.5)
EOSINOPHIL NFR BLD AUTO: 4 % — SIGNIFICANT CHANGE UP (ref 0–6)
EPI CELLS # UR: PRESENT
FLUAV AG NPH QL: SIGNIFICANT CHANGE UP
FLUBV AG NPH QL: SIGNIFICANT CHANGE UP
GLUCOSE SERPL-MCNC: 107 MG/DL — HIGH (ref 70–99)
GLUCOSE SERPL-MCNC: 114 MG/DL — HIGH (ref 70–99)
GLUCOSE UR QL: NEGATIVE MG/DL — SIGNIFICANT CHANGE UP
HCT VFR BLD CALC: 33.5 % — LOW (ref 34.5–45)
HGB BLD-MCNC: 11.2 G/DL — LOW (ref 11.5–15.5)
IMM GRANULOCYTES # BLD AUTO: 0.08 K/UL — HIGH (ref 0–0.07)
IMM GRANULOCYTES NFR BLD AUTO: 0.9 % — SIGNIFICANT CHANGE UP (ref 0–0.9)
KETONES UR QL: NEGATIVE MG/DL — SIGNIFICANT CHANGE UP
LACTATE SERPL-SCNC: 1.8 MMOL/L — SIGNIFICANT CHANGE UP (ref 0.7–2)
LEUKOCYTE ESTERASE UR-ACNC: ABNORMAL
LYMPHOCYTES # BLD AUTO: 1.82 K/UL — SIGNIFICANT CHANGE UP (ref 1–3.3)
LYMPHOCYTES NFR BLD AUTO: 19.6 % — SIGNIFICANT CHANGE UP (ref 13–44)
MCHC RBC-ENTMCNC: 29.2 PG — SIGNIFICANT CHANGE UP (ref 27–34)
MCHC RBC-ENTMCNC: 33.4 G/DL — SIGNIFICANT CHANGE UP (ref 32–36)
MCV RBC AUTO: 87.2 FL — SIGNIFICANT CHANGE UP (ref 80–100)
MONOCYTES # BLD AUTO: 1.06 K/UL — HIGH (ref 0–0.9)
MONOCYTES NFR BLD AUTO: 11.4 % — SIGNIFICANT CHANGE UP (ref 2–14)
NEUTROPHILS # BLD AUTO: 5.91 K/UL — SIGNIFICANT CHANGE UP (ref 1.8–7.4)
NEUTROPHILS NFR BLD AUTO: 63.7 % — SIGNIFICANT CHANGE UP (ref 43–77)
NITRITE UR-MCNC: NEGATIVE — SIGNIFICANT CHANGE UP
NRBC # BLD AUTO: 0 K/UL — SIGNIFICANT CHANGE UP (ref 0–0)
NRBC # FLD: 0 K/UL — SIGNIFICANT CHANGE UP (ref 0–0)
NRBC BLD AUTO-RTO: 0 /100 WBCS — SIGNIFICANT CHANGE UP (ref 0–0)
NT-PROBNP SERPL-SCNC: 73 PG/ML — SIGNIFICANT CHANGE UP (ref 0–300)
PH UR: 6 — SIGNIFICANT CHANGE UP (ref 5–8)
PLATELET # BLD AUTO: 248 K/UL — SIGNIFICANT CHANGE UP (ref 150–400)
PMV BLD: 9.3 FL — SIGNIFICANT CHANGE UP (ref 7–13)
POTASSIUM SERPL-MCNC: 5.4 MMOL/L — HIGH (ref 3.5–5)
POTASSIUM SERPL-MCNC: 5.4 MMOL/L — HIGH (ref 3.5–5)
POTASSIUM SERPL-SCNC: 5.4 MMOL/L — HIGH (ref 3.5–5)
POTASSIUM SERPL-SCNC: 5.4 MMOL/L — HIGH (ref 3.5–5)
PROT SERPL-MCNC: 6.9 G/DL — SIGNIFICANT CHANGE UP (ref 6–8)
PROT UR-MCNC: NEGATIVE MG/DL — SIGNIFICANT CHANGE UP
RBC # BLD: 3.84 M/UL — SIGNIFICANT CHANGE UP (ref 3.8–5.2)
RBC # FLD: 12.9 % — SIGNIFICANT CHANGE UP (ref 10.3–14.5)
RBC CASTS # UR COMP ASSIST: 1 /HPF — SIGNIFICANT CHANGE UP (ref 0–4)
RSV RNA NPH QL NAA+NON-PROBE: SIGNIFICANT CHANGE UP
SARS-COV-2 RNA SPEC QL NAA+PROBE: SIGNIFICANT CHANGE UP
SODIUM SERPL-SCNC: 135 MMOL/L — SIGNIFICANT CHANGE UP (ref 135–146)
SODIUM SERPL-SCNC: 137 MMOL/L — SIGNIFICANT CHANGE UP (ref 135–146)
SOURCE RESPIRATORY: SIGNIFICANT CHANGE UP
SP GR SPEC: 1.01 — SIGNIFICANT CHANGE UP (ref 1–1.03)
TROPONIN T, HIGH SENSITIVITY RESULT: 17 NG/L — HIGH (ref 6–13)
TROPONIN T, HIGH SENSITIVITY RESULT: 19 NG/L — HIGH (ref 6–13)
UROBILINOGEN FLD QL: 0.2 MG/DL — SIGNIFICANT CHANGE UP (ref 0.2–1)
WBC # BLD: 9.28 K/UL — SIGNIFICANT CHANGE UP (ref 3.8–10.5)
WBC # FLD AUTO: 9.28 K/UL — SIGNIFICANT CHANGE UP (ref 3.8–10.5)
WBC UR QL: 12 /HPF — HIGH (ref 0–5)

## 2025-08-26 PROCEDURE — 36415 COLL VENOUS BLD VENIPUNCTURE: CPT

## 2025-08-26 PROCEDURE — 93306 TTE W/DOPPLER COMPLETE: CPT

## 2025-08-26 PROCEDURE — 97116 GAIT TRAINING THERAPY: CPT | Mod: GP

## 2025-08-26 PROCEDURE — 92610 EVALUATE SWALLOWING FUNCTION: CPT | Mod: GN

## 2025-08-26 PROCEDURE — 84443 ASSAY THYROID STIM HORMONE: CPT

## 2025-08-26 PROCEDURE — 99285 EMERGENCY DEPT VISIT HI MDM: CPT | Mod: FS

## 2025-08-26 PROCEDURE — 97110 THERAPEUTIC EXERCISES: CPT | Mod: GP

## 2025-08-26 PROCEDURE — 80053 COMPREHEN METABOLIC PANEL: CPT

## 2025-08-26 PROCEDURE — 82140 ASSAY OF AMMONIA: CPT

## 2025-08-26 PROCEDURE — 85025 COMPLETE CBC W/AUTO DIFF WBC: CPT

## 2025-08-26 PROCEDURE — 70450 CT HEAD/BRAIN W/O DYE: CPT | Mod: 26

## 2025-08-26 PROCEDURE — 82746 ASSAY OF FOLIC ACID SERUM: CPT

## 2025-08-26 PROCEDURE — 74018 RADEX ABDOMEN 1 VIEW: CPT

## 2025-08-26 PROCEDURE — 86780 TREPONEMA PALLIDUM: CPT

## 2025-08-26 PROCEDURE — 99223 1ST HOSP IP/OBS HIGH 75: CPT | Mod: FS

## 2025-08-26 PROCEDURE — 83880 ASSAY OF NATRIURETIC PEPTIDE: CPT

## 2025-08-26 PROCEDURE — 71045 X-RAY EXAM CHEST 1 VIEW: CPT | Mod: 26

## 2025-08-26 PROCEDURE — 95819 EEG AWAKE AND ASLEEP: CPT

## 2025-08-26 PROCEDURE — 80048 BASIC METABOLIC PNL TOTAL CA: CPT

## 2025-08-26 PROCEDURE — 82607 VITAMIN B-12: CPT

## 2025-08-26 PROCEDURE — 84484 ASSAY OF TROPONIN QUANT: CPT

## 2025-08-26 RX ORDER — LATANOPROST PF 0.05 MG/ML
1 SOLUTION/ DROPS OPHTHALMIC AT BEDTIME
Refills: 0 | Status: DISCONTINUED | OUTPATIENT
Start: 2025-08-26 | End: 2025-09-02

## 2025-08-26 RX ORDER — DEXTROMETHORPHAN HBR, GUAIFENESIN 200 MG/10ML
600 LIQUID ORAL ONCE
Refills: 0 | Status: COMPLETED | OUTPATIENT
Start: 2025-08-26 | End: 2025-08-26

## 2025-08-26 RX ORDER — LEVOTHYROXINE SODIUM 300 MCG
44 TABLET ORAL
Refills: 0 | Status: DISCONTINUED | OUTPATIENT
Start: 2025-08-27 | End: 2025-08-27

## 2025-08-26 RX ORDER — LEVETIRACETAM 10 MG/ML
750 INJECTION, SOLUTION INTRAVENOUS EVERY 12 HOURS
Refills: 0 | Status: DISCONTINUED | OUTPATIENT
Start: 2025-08-26 | End: 2025-08-27

## 2025-08-26 RX ORDER — HEPARIN SODIUM 1000 [USP'U]/ML
5000 INJECTION INTRAVENOUS; SUBCUTANEOUS EVERY 8 HOURS
Refills: 0 | Status: DISCONTINUED | OUTPATIENT
Start: 2025-08-26 | End: 2025-09-02

## 2025-08-26 RX ORDER — RISPERIDONE 4 MG
1 TABLET ORAL
Refills: 0 | DISCHARGE

## 2025-08-26 RX ORDER — LAMOTRIGINE 150 MG/1
100 TABLET ORAL EVERY 12 HOURS
Refills: 0 | Status: DISCONTINUED | OUTPATIENT
Start: 2025-08-26 | End: 2025-09-02

## 2025-08-26 RX ADMIN — Medication 50 MILLILITER(S): at 21:45

## 2025-08-26 RX ADMIN — HEPARIN SODIUM 5000 UNIT(S): 1000 INJECTION INTRAVENOUS; SUBCUTANEOUS at 21:45

## 2025-08-26 RX ADMIN — Medication 50 MILLILITER(S): at 18:10

## 2025-08-26 RX ADMIN — LEVETIRACETAM 750 MILLIGRAM(S): 10 INJECTION, SOLUTION INTRAVENOUS at 18:10

## 2025-08-26 RX ADMIN — Medication 1000 MILLILITER(S): at 14:21

## 2025-08-26 RX ADMIN — LATANOPROST PF 1 DROP(S): 0.05 SOLUTION/ DROPS OPHTHALMIC at 21:45

## 2025-08-26 RX ADMIN — DEXTROMETHORPHAN HBR, GUAIFENESIN 600 MILLIGRAM(S): 200 LIQUID ORAL at 14:22

## 2025-08-26 RX ADMIN — LAMOTRIGINE 100 MILLIGRAM(S): 150 TABLET ORAL at 18:37

## 2025-08-27 ENCOUNTER — RESULT REVIEW (OUTPATIENT)
Age: 85
End: 2025-08-27

## 2025-08-27 DIAGNOSIS — G93.41 METABOLIC ENCEPHALOPATHY: ICD-10-CM

## 2025-08-27 DIAGNOSIS — Z71.89 OTHER SPECIFIED COUNSELING: ICD-10-CM

## 2025-08-27 DIAGNOSIS — Z51.5 ENCOUNTER FOR PALLIATIVE CARE: ICD-10-CM

## 2025-08-27 DIAGNOSIS — N39.0 URINARY TRACT INFECTION, SITE NOT SPECIFIED: ICD-10-CM

## 2025-08-27 LAB
ALBUMIN SERPL ELPH-MCNC: 3.8 G/DL — SIGNIFICANT CHANGE UP (ref 3.5–5.2)
ALP SERPL-CCNC: 85 U/L — SIGNIFICANT CHANGE UP (ref 30–115)
ALT FLD-CCNC: 16 U/L — SIGNIFICANT CHANGE UP (ref 0–41)
AMMONIA BLD-MCNC: 14 UMOL/L — SIGNIFICANT CHANGE UP (ref 11–55)
ANION GAP SERPL CALC-SCNC: 13 MMOL/L — SIGNIFICANT CHANGE UP (ref 7–14)
AST SERPL-CCNC: 29 U/L — SIGNIFICANT CHANGE UP (ref 0–41)
BASOPHILS # BLD AUTO: 0.04 K/UL — SIGNIFICANT CHANGE UP (ref 0–0.2)
BASOPHILS NFR BLD AUTO: 0.6 % — SIGNIFICANT CHANGE UP (ref 0–2)
BILIRUB SERPL-MCNC: 0.3 MG/DL — SIGNIFICANT CHANGE UP (ref 0.2–1.2)
BUN SERPL-MCNC: 26 MG/DL — HIGH (ref 10–20)
CALCIUM SERPL-MCNC: 8.8 MG/DL — SIGNIFICANT CHANGE UP (ref 8.4–10.5)
CHLORIDE SERPL-SCNC: 103 MMOL/L — SIGNIFICANT CHANGE UP (ref 98–110)
CO2 SERPL-SCNC: 21 MMOL/L — SIGNIFICANT CHANGE UP (ref 17–32)
CREAT SERPL-MCNC: 1.4 MG/DL — SIGNIFICANT CHANGE UP (ref 0.7–1.5)
EGFR: 37 ML/MIN/1.73M2 — LOW
EGFR: 37 ML/MIN/1.73M2 — LOW
EOSINOPHIL # BLD AUTO: 0.51 K/UL — HIGH (ref 0–0.5)
EOSINOPHIL NFR BLD AUTO: 7.7 % — HIGH (ref 0–6)
FOLATE SERPL-MCNC: 12 NG/ML — SIGNIFICANT CHANGE UP
GLUCOSE SERPL-MCNC: 107 MG/DL — HIGH (ref 70–99)
HCT VFR BLD CALC: 34.3 % — LOW (ref 34.5–45)
HGB BLD-MCNC: 11.3 G/DL — LOW (ref 11.5–15.5)
IMM GRANULOCYTES # BLD AUTO: 0.02 K/UL — SIGNIFICANT CHANGE UP (ref 0–0.07)
IMM GRANULOCYTES NFR BLD AUTO: 0.3 % — SIGNIFICANT CHANGE UP (ref 0–0.9)
LYMPHOCYTES # BLD AUTO: 1.58 K/UL — SIGNIFICANT CHANGE UP (ref 1–3.3)
LYMPHOCYTES NFR BLD AUTO: 23.9 % — SIGNIFICANT CHANGE UP (ref 13–44)
MCHC RBC-ENTMCNC: 29.3 PG — SIGNIFICANT CHANGE UP (ref 27–34)
MCHC RBC-ENTMCNC: 32.9 G/DL — SIGNIFICANT CHANGE UP (ref 32–36)
MCV RBC AUTO: 88.9 FL — SIGNIFICANT CHANGE UP (ref 80–100)
MONOCYTES # BLD AUTO: 0.73 K/UL — SIGNIFICANT CHANGE UP (ref 0–0.9)
MONOCYTES NFR BLD AUTO: 11.1 % — SIGNIFICANT CHANGE UP (ref 2–14)
NEUTROPHILS # BLD AUTO: 3.72 K/UL — SIGNIFICANT CHANGE UP (ref 1.8–7.4)
NEUTROPHILS NFR BLD AUTO: 56.4 % — SIGNIFICANT CHANGE UP (ref 43–77)
NRBC # BLD AUTO: 0 K/UL — SIGNIFICANT CHANGE UP (ref 0–0)
NRBC # FLD: 0 K/UL — SIGNIFICANT CHANGE UP (ref 0–0)
NRBC BLD AUTO-RTO: 0 /100 WBCS — SIGNIFICANT CHANGE UP (ref 0–0)
NT-PROBNP SERPL-SCNC: 109 PG/ML — SIGNIFICANT CHANGE UP (ref 0–300)
PLATELET # BLD AUTO: 229 K/UL — SIGNIFICANT CHANGE UP (ref 150–400)
PMV BLD: 9.2 FL — SIGNIFICANT CHANGE UP (ref 7–13)
POTASSIUM SERPL-MCNC: 5.1 MMOL/L — HIGH (ref 3.5–5)
POTASSIUM SERPL-SCNC: 5.1 MMOL/L — HIGH (ref 3.5–5)
PROT SERPL-MCNC: 6.5 G/DL — SIGNIFICANT CHANGE UP (ref 6–8)
RBC # BLD: 3.86 M/UL — SIGNIFICANT CHANGE UP (ref 3.8–5.2)
RBC # FLD: 13 % — SIGNIFICANT CHANGE UP (ref 10.3–14.5)
SODIUM SERPL-SCNC: 137 MMOL/L — SIGNIFICANT CHANGE UP (ref 135–146)
T PALLIDUM AB TITR SER: NEGATIVE — SIGNIFICANT CHANGE UP
TSH SERPL-MCNC: 0.75 UIU/ML — SIGNIFICANT CHANGE UP (ref 0.27–4.2)
VIT B12 SERPL-MCNC: 719 PG/ML — SIGNIFICANT CHANGE UP (ref 232–1245)
WBC # BLD: 6.6 K/UL — SIGNIFICANT CHANGE UP (ref 3.8–10.5)
WBC # FLD AUTO: 6.6 K/UL — SIGNIFICANT CHANGE UP (ref 3.8–10.5)

## 2025-08-27 PROCEDURE — 95816 EEG AWAKE AND DROWSY: CPT | Mod: 26

## 2025-08-27 PROCEDURE — 99233 SBSQ HOSP IP/OBS HIGH 50: CPT

## 2025-08-27 PROCEDURE — 93306 TTE W/DOPPLER COMPLETE: CPT | Mod: 26

## 2025-08-27 PROCEDURE — 74018 RADEX ABDOMEN 1 VIEW: CPT | Mod: 26

## 2025-08-27 PROCEDURE — 99222 1ST HOSP IP/OBS MODERATE 55: CPT | Mod: 25

## 2025-08-27 RX ORDER — SODIUM ZIRCONIUM CYCLOSILICATE 5 G/5G
5 POWDER, FOR SUSPENSION ORAL ONCE
Refills: 0 | Status: COMPLETED | OUTPATIENT
Start: 2025-08-27 | End: 2025-08-27

## 2025-08-27 RX ORDER — CEFTRIAXONE 500 MG/1
1000 INJECTION, POWDER, FOR SOLUTION INTRAMUSCULAR; INTRAVENOUS EVERY 24 HOURS
Refills: 0 | Status: DISCONTINUED | OUTPATIENT
Start: 2025-08-27 | End: 2025-08-30

## 2025-08-27 RX ORDER — LEVOTHYROXINE SODIUM 300 MCG
88 TABLET ORAL DAILY
Refills: 0 | Status: DISCONTINUED | OUTPATIENT
Start: 2025-08-28 | End: 2025-09-02

## 2025-08-27 RX ORDER — LEVETIRACETAM 10 MG/ML
750 INJECTION, SOLUTION INTRAVENOUS
Refills: 0 | Status: DISCONTINUED | OUTPATIENT
Start: 2025-08-27 | End: 2025-09-02

## 2025-08-27 RX ADMIN — LAMOTRIGINE 100 MILLIGRAM(S): 150 TABLET ORAL at 05:24

## 2025-08-27 RX ADMIN — HEPARIN SODIUM 5000 UNIT(S): 1000 INJECTION INTRAVENOUS; SUBCUTANEOUS at 21:32

## 2025-08-27 RX ADMIN — SODIUM ZIRCONIUM CYCLOSILICATE 5 GRAM(S): 5 POWDER, FOR SUSPENSION ORAL at 11:42

## 2025-08-27 RX ADMIN — LAMOTRIGINE 100 MILLIGRAM(S): 150 TABLET ORAL at 18:11

## 2025-08-27 RX ADMIN — LEVETIRACETAM 750 MILLIGRAM(S): 10 INJECTION, SOLUTION INTRAVENOUS at 05:23

## 2025-08-27 RX ADMIN — LATANOPROST PF 1 DROP(S): 0.05 SOLUTION/ DROPS OPHTHALMIC at 21:32

## 2025-08-27 RX ADMIN — LEVETIRACETAM 750 MILLIGRAM(S): 10 INJECTION, SOLUTION INTRAVENOUS at 18:11

## 2025-08-27 RX ADMIN — Medication 1 APPLICATION(S): at 05:22

## 2025-08-27 RX ADMIN — HEPARIN SODIUM 5000 UNIT(S): 1000 INJECTION INTRAVENOUS; SUBCUTANEOUS at 05:23

## 2025-08-27 RX ADMIN — CEFTRIAXONE 100 MILLIGRAM(S): 500 INJECTION, POWDER, FOR SOLUTION INTRAMUSCULAR; INTRAVENOUS at 11:42

## 2025-08-27 RX ADMIN — HEPARIN SODIUM 5000 UNIT(S): 1000 INJECTION INTRAVENOUS; SUBCUTANEOUS at 14:33

## 2025-08-27 RX ADMIN — Medication 44 MICROGRAM(S): at 11:39

## 2025-08-28 LAB
ANION GAP SERPL CALC-SCNC: 13 MMOL/L — SIGNIFICANT CHANGE UP (ref 7–14)
BASOPHILS # BLD AUTO: 0.03 K/UL — SIGNIFICANT CHANGE UP (ref 0–0.2)
BASOPHILS NFR BLD AUTO: 0.4 % — SIGNIFICANT CHANGE UP (ref 0–2)
BUN SERPL-MCNC: 21 MG/DL — HIGH (ref 10–20)
CALCIUM SERPL-MCNC: 8.9 MG/DL — SIGNIFICANT CHANGE UP (ref 8.4–10.5)
CHLORIDE SERPL-SCNC: 102 MMOL/L — SIGNIFICANT CHANGE UP (ref 98–110)
CO2 SERPL-SCNC: 23 MMOL/L — SIGNIFICANT CHANGE UP (ref 17–32)
CREAT SERPL-MCNC: 1.2 MG/DL — SIGNIFICANT CHANGE UP (ref 0.7–1.5)
EGFR: 45 ML/MIN/1.73M2 — LOW
EGFR: 45 ML/MIN/1.73M2 — LOW
EOSINOPHIL # BLD AUTO: 0.37 K/UL — SIGNIFICANT CHANGE UP (ref 0–0.5)
EOSINOPHIL NFR BLD AUTO: 4.6 % — SIGNIFICANT CHANGE UP (ref 0–6)
GLUCOSE SERPL-MCNC: 100 MG/DL — HIGH (ref 70–99)
HCT VFR BLD CALC: 36.7 % — SIGNIFICANT CHANGE UP (ref 34.5–45)
HGB BLD-MCNC: 12 G/DL — SIGNIFICANT CHANGE UP (ref 11.5–15.5)
IMM GRANULOCYTES # BLD AUTO: 0.02 K/UL — SIGNIFICANT CHANGE UP (ref 0–0.07)
IMM GRANULOCYTES NFR BLD AUTO: 0.2 % — SIGNIFICANT CHANGE UP (ref 0–0.9)
LYMPHOCYTES # BLD AUTO: 2.27 K/UL — SIGNIFICANT CHANGE UP (ref 1–3.3)
LYMPHOCYTES NFR BLD AUTO: 28.1 % — SIGNIFICANT CHANGE UP (ref 13–44)
MCHC RBC-ENTMCNC: 29.1 PG — SIGNIFICANT CHANGE UP (ref 27–34)
MCHC RBC-ENTMCNC: 32.7 G/DL — SIGNIFICANT CHANGE UP (ref 32–36)
MCV RBC AUTO: 89.1 FL — SIGNIFICANT CHANGE UP (ref 80–100)
MONOCYTES # BLD AUTO: 0.88 K/UL — SIGNIFICANT CHANGE UP (ref 0–0.9)
MONOCYTES NFR BLD AUTO: 10.9 % — SIGNIFICANT CHANGE UP (ref 2–14)
NEUTROPHILS # BLD AUTO: 4.5 K/UL — SIGNIFICANT CHANGE UP (ref 1.8–7.4)
NEUTROPHILS NFR BLD AUTO: 55.8 % — SIGNIFICANT CHANGE UP (ref 43–77)
NRBC # BLD AUTO: 0 K/UL — SIGNIFICANT CHANGE UP (ref 0–0)
NRBC # FLD: 0 K/UL — SIGNIFICANT CHANGE UP (ref 0–0)
NRBC BLD AUTO-RTO: 0 /100 WBCS — SIGNIFICANT CHANGE UP (ref 0–0)
PLATELET # BLD AUTO: 261 K/UL — SIGNIFICANT CHANGE UP (ref 150–400)
PMV BLD: 9.9 FL — SIGNIFICANT CHANGE UP (ref 7–13)
POTASSIUM SERPL-MCNC: 4.8 MMOL/L — SIGNIFICANT CHANGE UP (ref 3.5–5)
POTASSIUM SERPL-SCNC: 4.8 MMOL/L — SIGNIFICANT CHANGE UP (ref 3.5–5)
RBC # BLD: 4.12 M/UL — SIGNIFICANT CHANGE UP (ref 3.8–5.2)
RBC # FLD: 13 % — SIGNIFICANT CHANGE UP (ref 10.3–14.5)
SODIUM SERPL-SCNC: 138 MMOL/L — SIGNIFICANT CHANGE UP (ref 135–146)
WBC # BLD: 8.07 K/UL — SIGNIFICANT CHANGE UP (ref 3.8–10.5)
WBC # FLD AUTO: 8.07 K/UL — SIGNIFICANT CHANGE UP (ref 3.8–10.5)

## 2025-08-28 PROCEDURE — 74018 RADEX ABDOMEN 1 VIEW: CPT | Mod: 26

## 2025-08-28 PROCEDURE — 99233 SBSQ HOSP IP/OBS HIGH 50: CPT

## 2025-08-28 PROCEDURE — 99232 SBSQ HOSP IP/OBS MODERATE 35: CPT | Mod: 25

## 2025-08-28 RX ORDER — PAROXETINE HYDROCHLORIDE 20 MG/1
30 TABLET, FILM COATED ORAL DAILY
Refills: 0 | Status: DISCONTINUED | OUTPATIENT
Start: 2025-08-28 | End: 2025-09-02

## 2025-08-28 RX ORDER — RISPERIDONE 4 MG
0.5 TABLET ORAL AT BEDTIME
Refills: 0 | Status: DISCONTINUED | OUTPATIENT
Start: 2025-08-28 | End: 2025-09-02

## 2025-08-28 RX ORDER — QUETIAPINE FUMARATE 25 MG/1
25 TABLET ORAL
Refills: 0 | Status: DISCONTINUED | OUTPATIENT
Start: 2025-08-28 | End: 2025-09-02

## 2025-08-28 RX ORDER — POLYETHYLENE GLYCOL 3350 17 G/17G
17 POWDER, FOR SOLUTION ORAL
Refills: 0 | Status: DISCONTINUED | OUTPATIENT
Start: 2025-08-28 | End: 2025-09-02

## 2025-08-28 RX ORDER — QUETIAPINE FUMARATE 25 MG/1
50 TABLET ORAL DAILY
Refills: 0 | Status: DISCONTINUED | OUTPATIENT
Start: 2025-08-28 | End: 2025-09-02

## 2025-08-28 RX ORDER — MAGNESIUM CITRATE
296 SOLUTION, ORAL ORAL ONCE
Refills: 0 | Status: COMPLETED | OUTPATIENT
Start: 2025-08-28 | End: 2025-08-28

## 2025-08-28 RX ORDER — ROSUVASTATIN CALCIUM 20 MG/1
20 TABLET, FILM COATED ORAL AT BEDTIME
Refills: 0 | Status: DISCONTINUED | OUTPATIENT
Start: 2025-08-28 | End: 2025-09-02

## 2025-08-28 RX ORDER — SENNA 187 MG
2 TABLET ORAL AT BEDTIME
Refills: 0 | Status: DISCONTINUED | OUTPATIENT
Start: 2025-08-28 | End: 2025-09-02

## 2025-08-28 RX ADMIN — Medication 0.5 MILLIGRAM(S): at 21:47

## 2025-08-28 RX ADMIN — HEPARIN SODIUM 5000 UNIT(S): 1000 INJECTION INTRAVENOUS; SUBCUTANEOUS at 05:15

## 2025-08-28 RX ADMIN — QUETIAPINE FUMARATE 50 MILLIGRAM(S): 25 TABLET ORAL at 14:31

## 2025-08-28 RX ADMIN — ROSUVASTATIN CALCIUM 20 MILLIGRAM(S): 20 TABLET, FILM COATED ORAL at 21:47

## 2025-08-28 RX ADMIN — CEFTRIAXONE 100 MILLIGRAM(S): 500 INJECTION, POWDER, FOR SOLUTION INTRAMUSCULAR; INTRAVENOUS at 13:19

## 2025-08-28 RX ADMIN — HEPARIN SODIUM 5000 UNIT(S): 1000 INJECTION INTRAVENOUS; SUBCUTANEOUS at 21:47

## 2025-08-28 RX ADMIN — Medication 2 TABLET(S): at 21:47

## 2025-08-28 RX ADMIN — QUETIAPINE FUMARATE 25 MILLIGRAM(S): 25 TABLET ORAL at 21:47

## 2025-08-28 RX ADMIN — PAROXETINE HYDROCHLORIDE 30 MILLIGRAM(S): 20 TABLET, FILM COATED ORAL at 14:31

## 2025-08-28 RX ADMIN — LATANOPROST PF 1 DROP(S): 0.05 SOLUTION/ DROPS OPHTHALMIC at 21:50

## 2025-08-28 RX ADMIN — LAMOTRIGINE 100 MILLIGRAM(S): 150 TABLET ORAL at 17:44

## 2025-08-28 RX ADMIN — LEVETIRACETAM 750 MILLIGRAM(S): 10 INJECTION, SOLUTION INTRAVENOUS at 05:15

## 2025-08-28 RX ADMIN — LEVETIRACETAM 750 MILLIGRAM(S): 10 INJECTION, SOLUTION INTRAVENOUS at 17:43

## 2025-08-28 RX ADMIN — Medication 296 MILLILITER(S): at 13:18

## 2025-08-28 RX ADMIN — Medication 1 APPLICATION(S): at 05:15

## 2025-08-28 RX ADMIN — POLYETHYLENE GLYCOL 3350 17 GRAM(S): 17 POWDER, FOR SOLUTION ORAL at 17:44

## 2025-08-28 RX ADMIN — HEPARIN SODIUM 5000 UNIT(S): 1000 INJECTION INTRAVENOUS; SUBCUTANEOUS at 14:32

## 2025-08-28 RX ADMIN — LAMOTRIGINE 100 MILLIGRAM(S): 150 TABLET ORAL at 05:15

## 2025-08-29 LAB
ANION GAP SERPL CALC-SCNC: 14 MMOL/L — SIGNIFICANT CHANGE UP (ref 7–14)
BASOPHILS # BLD AUTO: 0.04 K/UL — SIGNIFICANT CHANGE UP (ref 0–0.2)
BASOPHILS NFR BLD AUTO: 0.5 % — SIGNIFICANT CHANGE UP (ref 0–2)
BUN SERPL-MCNC: 23 MG/DL — HIGH (ref 10–20)
CALCIUM SERPL-MCNC: 9.3 MG/DL — SIGNIFICANT CHANGE UP (ref 8.4–10.5)
CHLORIDE SERPL-SCNC: 102 MMOL/L — SIGNIFICANT CHANGE UP (ref 98–110)
CO2 SERPL-SCNC: 23 MMOL/L — SIGNIFICANT CHANGE UP (ref 17–32)
CREAT SERPL-MCNC: 1.2 MG/DL — SIGNIFICANT CHANGE UP (ref 0.7–1.5)
CULTURE RESULTS: NO GROWTH — SIGNIFICANT CHANGE UP
EGFR: 45 ML/MIN/1.73M2 — LOW
EGFR: 45 ML/MIN/1.73M2 — LOW
EOSINOPHIL # BLD AUTO: 0.43 K/UL — SIGNIFICANT CHANGE UP (ref 0–0.5)
EOSINOPHIL NFR BLD AUTO: 5.6 % — SIGNIFICANT CHANGE UP (ref 0–6)
GLUCOSE SERPL-MCNC: 109 MG/DL — HIGH (ref 70–99)
HCT VFR BLD CALC: 37.3 % — SIGNIFICANT CHANGE UP (ref 34.5–45)
HGB BLD-MCNC: 11.8 G/DL — SIGNIFICANT CHANGE UP (ref 11.5–15.5)
IMM GRANULOCYTES # BLD AUTO: 0.03 K/UL — SIGNIFICANT CHANGE UP (ref 0–0.07)
IMM GRANULOCYTES NFR BLD AUTO: 0.4 % — SIGNIFICANT CHANGE UP (ref 0–0.9)
LYMPHOCYTES # BLD AUTO: 2.02 K/UL — SIGNIFICANT CHANGE UP (ref 1–3.3)
LYMPHOCYTES NFR BLD AUTO: 26.3 % — SIGNIFICANT CHANGE UP (ref 13–44)
MCHC RBC-ENTMCNC: 28.5 PG — SIGNIFICANT CHANGE UP (ref 27–34)
MCHC RBC-ENTMCNC: 31.6 G/DL — LOW (ref 32–36)
MCV RBC AUTO: 90.1 FL — SIGNIFICANT CHANGE UP (ref 80–100)
MONOCYTES # BLD AUTO: 0.8 K/UL — SIGNIFICANT CHANGE UP (ref 0–0.9)
MONOCYTES NFR BLD AUTO: 10.4 % — SIGNIFICANT CHANGE UP (ref 2–14)
NEUTROPHILS # BLD AUTO: 4.36 K/UL — SIGNIFICANT CHANGE UP (ref 1.8–7.4)
NEUTROPHILS NFR BLD AUTO: 56.8 % — SIGNIFICANT CHANGE UP (ref 43–77)
NRBC # BLD AUTO: 0 K/UL — SIGNIFICANT CHANGE UP (ref 0–0)
NRBC # FLD: 0 K/UL — SIGNIFICANT CHANGE UP (ref 0–0)
NRBC BLD AUTO-RTO: 0 /100 WBCS — SIGNIFICANT CHANGE UP (ref 0–0)
PLATELET # BLD AUTO: 268 K/UL — SIGNIFICANT CHANGE UP (ref 150–400)
PMV BLD: 9.5 FL — SIGNIFICANT CHANGE UP (ref 7–13)
POTASSIUM SERPL-MCNC: 4.6 MMOL/L — SIGNIFICANT CHANGE UP (ref 3.5–5)
POTASSIUM SERPL-SCNC: 4.6 MMOL/L — SIGNIFICANT CHANGE UP (ref 3.5–5)
RBC # BLD: 4.14 M/UL — SIGNIFICANT CHANGE UP (ref 3.8–5.2)
RBC # FLD: 12.9 % — SIGNIFICANT CHANGE UP (ref 10.3–14.5)
SODIUM SERPL-SCNC: 139 MMOL/L — SIGNIFICANT CHANGE UP (ref 135–146)
SPECIMEN SOURCE: SIGNIFICANT CHANGE UP
WBC # BLD: 7.68 K/UL — SIGNIFICANT CHANGE UP (ref 3.8–10.5)
WBC # FLD AUTO: 7.68 K/UL — SIGNIFICANT CHANGE UP (ref 3.8–10.5)

## 2025-08-29 PROCEDURE — 99233 SBSQ HOSP IP/OBS HIGH 50: CPT

## 2025-08-29 PROCEDURE — 99232 SBSQ HOSP IP/OBS MODERATE 35: CPT | Mod: 25

## 2025-08-29 RX ADMIN — PAROXETINE HYDROCHLORIDE 30 MILLIGRAM(S): 20 TABLET, FILM COATED ORAL at 12:25

## 2025-08-29 RX ADMIN — HEPARIN SODIUM 5000 UNIT(S): 1000 INJECTION INTRAVENOUS; SUBCUTANEOUS at 21:43

## 2025-08-29 RX ADMIN — Medication 2 TABLET(S): at 21:45

## 2025-08-29 RX ADMIN — HEPARIN SODIUM 5000 UNIT(S): 1000 INJECTION INTRAVENOUS; SUBCUTANEOUS at 05:12

## 2025-08-29 RX ADMIN — POLYETHYLENE GLYCOL 3350 17 GRAM(S): 17 POWDER, FOR SOLUTION ORAL at 18:31

## 2025-08-29 RX ADMIN — QUETIAPINE FUMARATE 25 MILLIGRAM(S): 25 TABLET ORAL at 05:53

## 2025-08-29 RX ADMIN — CEFTRIAXONE 100 MILLIGRAM(S): 500 INJECTION, POWDER, FOR SOLUTION INTRAMUSCULAR; INTRAVENOUS at 10:54

## 2025-08-29 RX ADMIN — ROSUVASTATIN CALCIUM 20 MILLIGRAM(S): 20 TABLET, FILM COATED ORAL at 21:45

## 2025-08-29 RX ADMIN — LAMOTRIGINE 100 MILLIGRAM(S): 150 TABLET ORAL at 17:43

## 2025-08-29 RX ADMIN — HEPARIN SODIUM 5000 UNIT(S): 1000 INJECTION INTRAVENOUS; SUBCUTANEOUS at 14:10

## 2025-08-29 RX ADMIN — Medication 1 APPLICATION(S): at 05:53

## 2025-08-29 RX ADMIN — Medication 88 MICROGRAM(S): at 05:53

## 2025-08-29 RX ADMIN — QUETIAPINE FUMARATE 25 MILLIGRAM(S): 25 TABLET ORAL at 21:45

## 2025-08-29 RX ADMIN — LATANOPROST PF 1 DROP(S): 0.05 SOLUTION/ DROPS OPHTHALMIC at 21:43

## 2025-08-29 RX ADMIN — QUETIAPINE FUMARATE 50 MILLIGRAM(S): 25 TABLET ORAL at 12:26

## 2025-08-29 RX ADMIN — LEVETIRACETAM 750 MILLIGRAM(S): 10 INJECTION, SOLUTION INTRAVENOUS at 05:52

## 2025-08-29 RX ADMIN — POLYETHYLENE GLYCOL 3350 17 GRAM(S): 17 POWDER, FOR SOLUTION ORAL at 05:52

## 2025-08-29 RX ADMIN — LEVETIRACETAM 750 MILLIGRAM(S): 10 INJECTION, SOLUTION INTRAVENOUS at 17:31

## 2025-08-29 RX ADMIN — Medication 0.5 MILLIGRAM(S): at 21:46

## 2025-08-29 RX ADMIN — LAMOTRIGINE 100 MILLIGRAM(S): 150 TABLET ORAL at 05:52

## 2025-08-30 PROCEDURE — 99232 SBSQ HOSP IP/OBS MODERATE 35: CPT

## 2025-08-30 RX ORDER — SODIUM CHLORIDE 9 G/1000ML
1000 INJECTION, SOLUTION INTRAVENOUS
Refills: 0 | Status: DISCONTINUED | OUTPATIENT
Start: 2025-08-30 | End: 2025-09-02

## 2025-08-30 RX ADMIN — LEVETIRACETAM 750 MILLIGRAM(S): 10 INJECTION, SOLUTION INTRAVENOUS at 17:22

## 2025-08-30 RX ADMIN — LAMOTRIGINE 100 MILLIGRAM(S): 150 TABLET ORAL at 17:22

## 2025-08-30 RX ADMIN — HEPARIN SODIUM 5000 UNIT(S): 1000 INJECTION INTRAVENOUS; SUBCUTANEOUS at 06:13

## 2025-08-30 RX ADMIN — SODIUM CHLORIDE 50 MILLILITER(S): 9 INJECTION, SOLUTION INTRAVENOUS at 19:56

## 2025-08-30 RX ADMIN — QUETIAPINE FUMARATE 25 MILLIGRAM(S): 25 TABLET ORAL at 21:53

## 2025-08-30 RX ADMIN — Medication 2 TABLET(S): at 21:52

## 2025-08-30 RX ADMIN — LAMOTRIGINE 100 MILLIGRAM(S): 150 TABLET ORAL at 06:08

## 2025-08-30 RX ADMIN — QUETIAPINE FUMARATE 25 MILLIGRAM(S): 25 TABLET ORAL at 06:08

## 2025-08-30 RX ADMIN — PAROXETINE HYDROCHLORIDE 30 MILLIGRAM(S): 20 TABLET, FILM COATED ORAL at 13:09

## 2025-08-30 RX ADMIN — Medication 1 APPLICATION(S): at 06:56

## 2025-08-30 RX ADMIN — HEPARIN SODIUM 5000 UNIT(S): 1000 INJECTION INTRAVENOUS; SUBCUTANEOUS at 13:15

## 2025-08-30 RX ADMIN — POLYETHYLENE GLYCOL 3350 17 GRAM(S): 17 POWDER, FOR SOLUTION ORAL at 17:23

## 2025-08-30 RX ADMIN — POLYETHYLENE GLYCOL 3350 17 GRAM(S): 17 POWDER, FOR SOLUTION ORAL at 06:11

## 2025-08-30 RX ADMIN — LATANOPROST PF 1 DROP(S): 0.05 SOLUTION/ DROPS OPHTHALMIC at 21:55

## 2025-08-30 RX ADMIN — HEPARIN SODIUM 5000 UNIT(S): 1000 INJECTION INTRAVENOUS; SUBCUTANEOUS at 21:52

## 2025-08-30 RX ADMIN — Medication 88 MICROGRAM(S): at 06:10

## 2025-08-30 RX ADMIN — Medication 0.5 MILLIGRAM(S): at 21:55

## 2025-08-30 RX ADMIN — ROSUVASTATIN CALCIUM 20 MILLIGRAM(S): 20 TABLET, FILM COATED ORAL at 21:52

## 2025-08-30 RX ADMIN — LEVETIRACETAM 750 MILLIGRAM(S): 10 INJECTION, SOLUTION INTRAVENOUS at 06:08

## 2025-08-30 RX ADMIN — QUETIAPINE FUMARATE 50 MILLIGRAM(S): 25 TABLET ORAL at 13:09

## 2025-08-31 PROCEDURE — 99232 SBSQ HOSP IP/OBS MODERATE 35: CPT

## 2025-08-31 RX ADMIN — HEPARIN SODIUM 5000 UNIT(S): 1000 INJECTION INTRAVENOUS; SUBCUTANEOUS at 13:48

## 2025-08-31 RX ADMIN — LAMOTRIGINE 100 MILLIGRAM(S): 150 TABLET ORAL at 18:24

## 2025-08-31 RX ADMIN — POLYETHYLENE GLYCOL 3350 17 GRAM(S): 17 POWDER, FOR SOLUTION ORAL at 05:51

## 2025-08-31 RX ADMIN — HEPARIN SODIUM 5000 UNIT(S): 1000 INJECTION INTRAVENOUS; SUBCUTANEOUS at 05:49

## 2025-08-31 RX ADMIN — Medication 1 APPLICATION(S): at 05:49

## 2025-08-31 RX ADMIN — PAROXETINE HYDROCHLORIDE 30 MILLIGRAM(S): 20 TABLET, FILM COATED ORAL at 13:42

## 2025-08-31 RX ADMIN — Medication 2 TABLET(S): at 21:05

## 2025-08-31 RX ADMIN — Medication 88 MICROGRAM(S): at 05:50

## 2025-08-31 RX ADMIN — LEVETIRACETAM 750 MILLIGRAM(S): 10 INJECTION, SOLUTION INTRAVENOUS at 18:24

## 2025-08-31 RX ADMIN — Medication 0.5 MILLIGRAM(S): at 21:06

## 2025-08-31 RX ADMIN — LATANOPROST PF 1 DROP(S): 0.05 SOLUTION/ DROPS OPHTHALMIC at 21:06

## 2025-08-31 RX ADMIN — QUETIAPINE FUMARATE 50 MILLIGRAM(S): 25 TABLET ORAL at 13:43

## 2025-08-31 RX ADMIN — ROSUVASTATIN CALCIUM 20 MILLIGRAM(S): 20 TABLET, FILM COATED ORAL at 21:05

## 2025-08-31 RX ADMIN — LAMOTRIGINE 100 MILLIGRAM(S): 150 TABLET ORAL at 05:50

## 2025-08-31 RX ADMIN — HEPARIN SODIUM 5000 UNIT(S): 1000 INJECTION INTRAVENOUS; SUBCUTANEOUS at 21:05

## 2025-08-31 RX ADMIN — LEVETIRACETAM 750 MILLIGRAM(S): 10 INJECTION, SOLUTION INTRAVENOUS at 05:50

## 2025-08-31 RX ADMIN — QUETIAPINE FUMARATE 25 MILLIGRAM(S): 25 TABLET ORAL at 21:05

## 2025-08-31 RX ADMIN — POLYETHYLENE GLYCOL 3350 17 GRAM(S): 17 POWDER, FOR SOLUTION ORAL at 18:27

## 2025-08-31 RX ADMIN — QUETIAPINE FUMARATE 25 MILLIGRAM(S): 25 TABLET ORAL at 05:50

## 2025-09-01 PROCEDURE — 99232 SBSQ HOSP IP/OBS MODERATE 35: CPT

## 2025-09-01 RX ORDER — AMLODIPINE BESYLATE 10 MG/1
5 TABLET ORAL DAILY
Refills: 0 | Status: DISCONTINUED | OUTPATIENT
Start: 2025-09-01 | End: 2025-09-02

## 2025-09-01 RX ADMIN — Medication 1 APPLICATION(S): at 05:10

## 2025-09-01 RX ADMIN — QUETIAPINE FUMARATE 25 MILLIGRAM(S): 25 TABLET ORAL at 21:43

## 2025-09-01 RX ADMIN — Medication 2 TABLET(S): at 21:43

## 2025-09-01 RX ADMIN — QUETIAPINE FUMARATE 25 MILLIGRAM(S): 25 TABLET ORAL at 05:12

## 2025-09-01 RX ADMIN — POLYETHYLENE GLYCOL 3350 17 GRAM(S): 17 POWDER, FOR SOLUTION ORAL at 16:19

## 2025-09-01 RX ADMIN — LEVETIRACETAM 750 MILLIGRAM(S): 10 INJECTION, SOLUTION INTRAVENOUS at 05:10

## 2025-09-01 RX ADMIN — QUETIAPINE FUMARATE 50 MILLIGRAM(S): 25 TABLET ORAL at 10:06

## 2025-09-01 RX ADMIN — LAMOTRIGINE 100 MILLIGRAM(S): 150 TABLET ORAL at 05:11

## 2025-09-01 RX ADMIN — LEVETIRACETAM 750 MILLIGRAM(S): 10 INJECTION, SOLUTION INTRAVENOUS at 16:18

## 2025-09-01 RX ADMIN — LATANOPROST PF 1 DROP(S): 0.05 SOLUTION/ DROPS OPHTHALMIC at 21:44

## 2025-09-01 RX ADMIN — HEPARIN SODIUM 5000 UNIT(S): 1000 INJECTION INTRAVENOUS; SUBCUTANEOUS at 05:11

## 2025-09-01 RX ADMIN — PAROXETINE HYDROCHLORIDE 30 MILLIGRAM(S): 20 TABLET, FILM COATED ORAL at 10:06

## 2025-09-01 RX ADMIN — HEPARIN SODIUM 5000 UNIT(S): 1000 INJECTION INTRAVENOUS; SUBCUTANEOUS at 13:08

## 2025-09-01 RX ADMIN — ROSUVASTATIN CALCIUM 20 MILLIGRAM(S): 20 TABLET, FILM COATED ORAL at 21:43

## 2025-09-01 RX ADMIN — LAMOTRIGINE 100 MILLIGRAM(S): 150 TABLET ORAL at 16:18

## 2025-09-01 RX ADMIN — HEPARIN SODIUM 5000 UNIT(S): 1000 INJECTION INTRAVENOUS; SUBCUTANEOUS at 21:42

## 2025-09-01 RX ADMIN — POLYETHYLENE GLYCOL 3350 17 GRAM(S): 17 POWDER, FOR SOLUTION ORAL at 05:10

## 2025-09-01 RX ADMIN — Medication 0.5 MILLIGRAM(S): at 21:43

## 2025-09-01 RX ADMIN — Medication 88 MICROGRAM(S): at 05:10

## 2025-09-02 ENCOUNTER — TRANSCRIPTION ENCOUNTER (OUTPATIENT)
Age: 85
End: 2025-09-02

## 2025-09-02 VITALS
RESPIRATION RATE: 189 BRPM | TEMPERATURE: 98 F | HEART RATE: 89 BPM | DIASTOLIC BLOOD PRESSURE: 66 MMHG | SYSTOLIC BLOOD PRESSURE: 111 MMHG | OXYGEN SATURATION: 96 %

## 2025-09-02 PROCEDURE — 99239 HOSP IP/OBS DSCHRG MGMT >30: CPT

## 2025-09-02 RX ORDER — PAROXETINE HYDROCHLORIDE 20 MG/1
1 TABLET, FILM COATED ORAL
Qty: 0 | Refills: 0 | DISCHARGE
Start: 2025-09-02

## 2025-09-02 RX ORDER — QUETIAPINE FUMARATE 25 MG/1
1 TABLET ORAL
Qty: 0 | Refills: 0 | DISCHARGE
Start: 2025-09-02

## 2025-09-02 RX ORDER — SENNA 187 MG
2 TABLET ORAL
Qty: 0 | Refills: 0 | DISCHARGE
Start: 2025-09-02

## 2025-09-02 RX ORDER — LATANOPROST PF 0.05 MG/ML
1 SOLUTION/ DROPS OPHTHALMIC
Qty: 0 | Refills: 0 | DISCHARGE
Start: 2025-09-02

## 2025-09-02 RX ORDER — QUETIAPINE FUMARATE 25 MG/1
1 TABLET ORAL
Qty: 0 | Refills: 0 | DISCHARGE

## 2025-09-02 RX ORDER — LEVETIRACETAM 10 MG/ML
1 INJECTION, SOLUTION INTRAVENOUS
Qty: 0 | Refills: 0 | DISCHARGE
Start: 2025-09-02

## 2025-09-02 RX ORDER — AMLODIPINE BESYLATE 10 MG/1
1 TABLET ORAL
Qty: 0 | Refills: 0 | DISCHARGE
Start: 2025-09-02

## 2025-09-02 RX ORDER — LISINOPRIL 5 MG/1
1 TABLET ORAL
Refills: 0 | DISCHARGE

## 2025-09-02 RX ORDER — LAMOTRIGINE 150 MG/1
1 TABLET ORAL
Qty: 0 | Refills: 0 | DISCHARGE
Start: 2025-09-02

## 2025-09-02 RX ORDER — POLYETHYLENE GLYCOL 3350 17 G/17G
17 POWDER, FOR SOLUTION ORAL
Qty: 0 | Refills: 0 | DISCHARGE
Start: 2025-09-02

## 2025-09-02 RX ADMIN — Medication 1 APPLICATION(S): at 05:13

## 2025-09-02 RX ADMIN — LAMOTRIGINE 100 MILLIGRAM(S): 150 TABLET ORAL at 05:12

## 2025-09-02 RX ADMIN — POLYETHYLENE GLYCOL 3350 17 GRAM(S): 17 POWDER, FOR SOLUTION ORAL at 05:12

## 2025-09-02 RX ADMIN — AMLODIPINE BESYLATE 5 MILLIGRAM(S): 10 TABLET ORAL at 05:12

## 2025-09-02 RX ADMIN — POLYETHYLENE GLYCOL 3350 17 GRAM(S): 17 POWDER, FOR SOLUTION ORAL at 17:14

## 2025-09-02 RX ADMIN — LAMOTRIGINE 100 MILLIGRAM(S): 150 TABLET ORAL at 17:09

## 2025-09-02 RX ADMIN — Medication 88 MICROGRAM(S): at 05:12

## 2025-09-02 RX ADMIN — QUETIAPINE FUMARATE 25 MILLIGRAM(S): 25 TABLET ORAL at 05:12

## 2025-09-02 RX ADMIN — QUETIAPINE FUMARATE 50 MILLIGRAM(S): 25 TABLET ORAL at 11:12

## 2025-09-02 RX ADMIN — HEPARIN SODIUM 5000 UNIT(S): 1000 INJECTION INTRAVENOUS; SUBCUTANEOUS at 14:04

## 2025-09-02 RX ADMIN — HEPARIN SODIUM 5000 UNIT(S): 1000 INJECTION INTRAVENOUS; SUBCUTANEOUS at 05:12

## 2025-09-02 RX ADMIN — LEVETIRACETAM 750 MILLIGRAM(S): 10 INJECTION, SOLUTION INTRAVENOUS at 17:09

## 2025-09-02 RX ADMIN — LEVETIRACETAM 750 MILLIGRAM(S): 10 INJECTION, SOLUTION INTRAVENOUS at 05:12

## 2025-09-02 RX ADMIN — PAROXETINE HYDROCHLORIDE 30 MILLIGRAM(S): 20 TABLET, FILM COATED ORAL at 11:12

## 2025-09-10 DIAGNOSIS — Z91.041 RADIOGRAPHIC DYE ALLERGY STATUS: ICD-10-CM

## 2025-09-10 DIAGNOSIS — N18.4 CHRONIC KIDNEY DISEASE, STAGE 4 (SEVERE): ICD-10-CM

## 2025-09-10 DIAGNOSIS — G40.909 EPILEPSY, UNSPECIFIED, NOT INTRACTABLE, WITHOUT STATUS EPILEPTICUS: ICD-10-CM

## 2025-09-10 DIAGNOSIS — Z88.0 ALLERGY STATUS TO PENICILLIN: ICD-10-CM

## 2025-09-10 DIAGNOSIS — Z79.899 OTHER LONG TERM (CURRENT) DRUG THERAPY: ICD-10-CM

## 2025-09-10 DIAGNOSIS — I12.9 HYPERTENSIVE CHRONIC KIDNEY DISEASE WITH STAGE 1 THROUGH STAGE 4 CHRONIC KIDNEY DISEASE, OR UNSPECIFIED CHRONIC KIDNEY DISEASE: ICD-10-CM

## 2025-09-10 DIAGNOSIS — N17.9 ACUTE KIDNEY FAILURE, UNSPECIFIED: ICD-10-CM

## 2025-09-10 DIAGNOSIS — N39.0 URINARY TRACT INFECTION, SITE NOT SPECIFIED: ICD-10-CM

## 2025-09-10 DIAGNOSIS — R33.9 RETENTION OF URINE, UNSPECIFIED: ICD-10-CM

## 2025-09-10 DIAGNOSIS — E03.9 HYPOTHYROIDISM, UNSPECIFIED: ICD-10-CM

## 2025-09-10 DIAGNOSIS — E87.5 HYPERKALEMIA: ICD-10-CM

## 2025-09-10 DIAGNOSIS — K59.00 CONSTIPATION, UNSPECIFIED: ICD-10-CM

## 2025-09-10 DIAGNOSIS — G93.41 METABOLIC ENCEPHALOPATHY: ICD-10-CM

## 2025-09-10 DIAGNOSIS — F05 DELIRIUM DUE TO KNOWN PHYSIOLOGICAL CONDITION: ICD-10-CM

## 2025-09-10 DIAGNOSIS — Z79.890 HORMONE REPLACEMENT THERAPY: ICD-10-CM

## 2025-09-10 DIAGNOSIS — F03.93 UNSPECIFIED DEMENTIA, UNSPECIFIED SEVERITY, WITH MOOD DISTURBANCE: ICD-10-CM
